# Patient Record
Sex: FEMALE | Race: WHITE | NOT HISPANIC OR LATINO
[De-identification: names, ages, dates, MRNs, and addresses within clinical notes are randomized per-mention and may not be internally consistent; named-entity substitution may affect disease eponyms.]

---

## 2018-05-10 ENCOUNTER — APPOINTMENT (OUTPATIENT)
Dept: OTOLARYNGOLOGY | Facility: CLINIC | Age: 68
End: 2018-05-10
Payer: MEDICARE

## 2018-05-10 VITALS
HEART RATE: 63 BPM | BODY MASS INDEX: 20.77 KG/M2 | HEIGHT: 61 IN | DIASTOLIC BLOOD PRESSURE: 61 MMHG | SYSTOLIC BLOOD PRESSURE: 110 MMHG | WEIGHT: 110 LBS

## 2018-05-10 DIAGNOSIS — H93.13 TINNITUS, BILATERAL: ICD-10-CM

## 2018-05-10 DIAGNOSIS — H90.3 SENSORINEURAL HEARING LOSS, BILATERAL: ICD-10-CM

## 2018-05-10 DIAGNOSIS — Z86.39 PERSONAL HISTORY OF OTHER ENDOCRINE, NUTRITIONAL AND METABOLIC DISEASE: ICD-10-CM

## 2018-05-10 DIAGNOSIS — Z86.79 PERSONAL HISTORY OF OTHER DISEASES OF THE CIRCULATORY SYSTEM: ICD-10-CM

## 2018-05-10 PROCEDURE — 92588 EVOKED AUDITORY TST COMPLETE: CPT

## 2018-05-10 PROCEDURE — 92557 COMPREHENSIVE HEARING TEST: CPT

## 2018-05-10 PROCEDURE — 92567 TYMPANOMETRY: CPT

## 2018-05-10 PROCEDURE — 99204 OFFICE O/P NEW MOD 45 MIN: CPT

## 2018-06-06 ENCOUNTER — APPOINTMENT (OUTPATIENT)
Dept: OTOLARYNGOLOGY | Facility: CLINIC | Age: 68
End: 2018-06-06

## 2019-06-03 ENCOUNTER — FORM ENCOUNTER (OUTPATIENT)
Age: 69
End: 2019-06-03

## 2019-06-03 ENCOUNTER — LABORATORY RESULT (OUTPATIENT)
Age: 69
End: 2019-06-03

## 2019-06-03 ENCOUNTER — APPOINTMENT (OUTPATIENT)
Dept: INTERNAL MEDICINE | Facility: CLINIC | Age: 69
End: 2019-06-03
Payer: MEDICARE

## 2019-06-03 ENCOUNTER — APPOINTMENT (OUTPATIENT)
Dept: INTERNAL MEDICINE | Facility: CLINIC | Age: 69
End: 2019-06-03

## 2019-06-03 VITALS
HEART RATE: 73 BPM | OXYGEN SATURATION: 97 % | TEMPERATURE: 98 F | BODY MASS INDEX: 20.08 KG/M2 | WEIGHT: 106.38 LBS | HEIGHT: 61 IN | DIASTOLIC BLOOD PRESSURE: 60 MMHG | SYSTOLIC BLOOD PRESSURE: 100 MMHG | RESPIRATION RATE: 18 BRPM

## 2019-06-03 PROCEDURE — 99214 OFFICE O/P EST MOD 30 MIN: CPT

## 2019-06-03 NOTE — ASSESSMENT
[FreeTextEntry1] : #1 recent viral illness. Patient is having tiredness. Some cough. Some sore throat. She also tells me she had a tick bite by a deer tick about one month ago. She denies fever or rash. We'll check labs for CBC, ESR, CMP, Lyme titers. Also chest x-ray. Patient instructed to call or return if symptoms are not improving/resolving.\par \par #2 patient is scheduled to travel to [facility] in one week and is requesting a scopolamine patch refill. This was refilled today. Patient knows if she is not feeling well on vacation she will see a physician while away.

## 2019-06-03 NOTE — HISTORY OF PRESENT ILLNESS
[FreeTextEntry1] : RV, tired [de-identified] : A 68-year-old female who was exposed to her grandson to 2-1/2 weeks ago who had a cold. Subsequently developed cold symptoms with sore throat, postnasal drip, cough; she was seen at urgent care and treated with Augmentin which she took for 10 days. Says throat culture "Strep G."  She now comes in with a complaint of feeling tired. She has some cough but no sputum production no shortness of breath. She does note a sore throat. Is not having fever or chills.\par \par She also states that she had a tick bite it in late April. He did not develop rash or fever.\par \par She does have bursitis of the left elbow recently and is being treated by orthopedics with topical voltaren and a wrap.

## 2019-06-03 NOTE — PHYSICAL EXAM
[No Acute Distress] : no acute distress [Well Nourished] : well nourished [Well Developed] : well developed [Normal Sclera/Conjunctiva] : normal sclera/conjunctiva [Normal Outer Ear/Nose] : the outer ears and nose were normal in appearance [Normal Oropharynx] : the oropharynx was normal [No JVD] : no jugular venous distention [Supple] : supple [No Lymphadenopathy] : no lymphadenopathy [No Respiratory Distress] : no respiratory distress  [No Accessory Muscle Use] : no accessory muscle use [Clear to Auscultation] : lungs were clear to auscultation bilaterally [Normal Rate] : normal rate  [Regular Rhythm] : with a regular rhythm [Normal S1, S2] : normal S1 and S2 [No Edema] : there was no peripheral edema [No Extremity Clubbing/Cyanosis] : no extremity clubbing/cyanosis [Soft] : abdomen soft [Non Tender] : non-tender [Non-distended] : non-distended [No HSM] : no HSM [Normal Bowel Sounds] : normal bowel sounds [Normal Anterior Cervical Nodes] : no anterior cervical lymphadenopathy [No Rash] : no rash [No Focal Deficits] : no focal deficits [Normal Affect] : the affect was normal [Normal Insight/Judgement] : insight and judgment were intact [de-identified] : TM's normal [de-identified] : swelling L elbow bursa, no redness.

## 2019-06-04 ENCOUNTER — OUTPATIENT (OUTPATIENT)
Dept: OUTPATIENT SERVICES | Facility: HOSPITAL | Age: 69
LOS: 1 days | End: 2019-06-04
Payer: MEDICARE

## 2019-06-04 ENCOUNTER — APPOINTMENT (OUTPATIENT)
Dept: RADIOLOGY | Facility: CLINIC | Age: 69
End: 2019-06-04
Payer: MEDICARE

## 2019-06-04 ENCOUNTER — LABORATORY RESULT (OUTPATIENT)
Age: 69
End: 2019-06-04

## 2019-06-04 DIAGNOSIS — Z00.8 ENCOUNTER FOR OTHER GENERAL EXAMINATION: ICD-10-CM

## 2019-06-04 LAB
ALBUMIN SERPL ELPH-MCNC: 4.7 G/DL
ALP BLD-CCNC: 76 U/L
ALT SERPL-CCNC: 29 U/L
ANION GAP SERPL CALC-SCNC: 11 MMOL/L
AST SERPL-CCNC: 29 U/L
B BURGDOR IGG+IGM SER QL IB: NORMAL
BASOPHILS # BLD AUTO: 0.09 K/UL
BASOPHILS NFR BLD AUTO: 1 %
BILIRUB SERPL-MCNC: 0.9 MG/DL
BUN SERPL-MCNC: 24 MG/DL
CALCIUM SERPL-MCNC: 9.7 MG/DL
CHLORIDE SERPL-SCNC: 101 MMOL/L
CO2 SERPL-SCNC: 28 MMOL/L
CREAT SERPL-MCNC: 0.84 MG/DL
EOSINOPHIL # BLD AUTO: 0.07 K/UL
EOSINOPHIL NFR BLD AUTO: 0.8 %
ERYTHROCYTE [SEDIMENTATION RATE] IN BLOOD BY WESTERGREN METHOD: 16 MM/HR
GLUCOSE SERPL-MCNC: 90 MG/DL
HCT VFR BLD CALC: 37.6 %
HGB BLD-MCNC: 12.6 G/DL
IMM GRANULOCYTES NFR BLD AUTO: 0.2 %
LYMPHOCYTES # BLD AUTO: 1.83 K/UL
LYMPHOCYTES NFR BLD AUTO: 20.5 %
MAN DIFF?: NORMAL
MCHC RBC-ENTMCNC: 30.4 PG
MCHC RBC-ENTMCNC: 33.5 GM/DL
MCV RBC AUTO: 90.8 FL
MONOCYTES # BLD AUTO: 0.49 K/UL
MONOCYTES NFR BLD AUTO: 5.5 %
NEUTROPHILS # BLD AUTO: 6.41 K/UL
NEUTROPHILS NFR BLD AUTO: 72 %
PLATELET # BLD AUTO: 260 K/UL
POTASSIUM SERPL-SCNC: 4.3 MMOL/L
PROT SERPL-MCNC: 6.7 G/DL
RBC # BLD: 4.14 M/UL
RBC # FLD: 12.2 %
SODIUM SERPL-SCNC: 140 MMOL/L
T3FREE SERPL-MCNC: 1.82 PG/ML
T4 FREE SERPL-MCNC: 1.2 NG/DL
TSH SERPL-ACNC: 3.67 UIU/ML
WBC # FLD AUTO: 8.91 K/UL

## 2019-06-04 PROCEDURE — 71046 X-RAY EXAM CHEST 2 VIEWS: CPT | Mod: 26

## 2019-06-04 PROCEDURE — 71046 X-RAY EXAM CHEST 2 VIEWS: CPT

## 2019-06-10 LAB — B BURGDOR DNA TICK QL NAA+PROBE: NOT DETECTED

## 2019-06-12 LAB

## 2021-01-15 ENCOUNTER — NON-APPOINTMENT (OUTPATIENT)
Age: 71
End: 2021-01-15

## 2021-01-15 ENCOUNTER — APPOINTMENT (OUTPATIENT)
Dept: INTERNAL MEDICINE | Facility: CLINIC | Age: 71
End: 2021-01-15
Payer: MEDICARE

## 2021-01-15 VITALS
BODY MASS INDEX: 21.14 KG/M2 | WEIGHT: 112 LBS | TEMPERATURE: 98.6 F | HEART RATE: 65 BPM | SYSTOLIC BLOOD PRESSURE: 108 MMHG | HEIGHT: 61 IN | DIASTOLIC BLOOD PRESSURE: 70 MMHG | RESPIRATION RATE: 18 BRPM | OXYGEN SATURATION: 98 %

## 2021-01-15 DIAGNOSIS — Z86.19 PERSONAL HISTORY OF OTHER INFECTIOUS AND PARASITIC DISEASES: ICD-10-CM

## 2021-01-15 DIAGNOSIS — R53.83 OTHER FATIGUE: ICD-10-CM

## 2021-01-15 DIAGNOSIS — Z87.898 PERSONAL HISTORY OF OTHER SPECIFIED CONDITIONS: ICD-10-CM

## 2021-01-15 DIAGNOSIS — J45.909 UNSPECIFIED ASTHMA, UNCOMPLICATED: ICD-10-CM
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
## 2021-01-15 PROCEDURE — G0439: CPT

## 2021-01-15 RX ORDER — PNV NO.95/FERROUS FUM/FOLIC AC 28MG-0.8MG
TABLET ORAL
Refills: 0 | Status: COMPLETED | COMMUNITY
End: 2021-01-15

## 2021-01-15 NOTE — PHYSICAL EXAM
[No Acute Distress] : no acute distress [Well Nourished] : well nourished [Well Developed] : well developed [PERRL] : pupils equal round and reactive to light [EOMI] : extraocular movements intact [Normal Oropharynx] : the oropharynx was normal [Normal TMs] : both tympanic membranes were normal [No Respiratory Distress] : no respiratory distress  [No Accessory Muscle Use] : no accessory muscle use [Clear to Auscultation] : lungs were clear to auscultation bilaterally [Normal Rate] : normal rate  [Regular Rhythm] : with a regular rhythm [Normal S1, S2] : normal S1 and S2 [Pedal Pulses Present] : the pedal pulses are present [No Extremity Clubbing/Cyanosis] : no extremity clubbing/cyanosis [Normal Posterior Cervical Nodes] : no posterior cervical lymphadenopathy [Normal Anterior Cervical Nodes] : no anterior cervical lymphadenopathy [No Focal Deficits] : no focal deficits [Coordination Grossly Intact] : coordination grossly intact [Normal Gait] : normal gait [Normal Affect] : the affect was normal [Alert and Oriented x3] : oriented to person, place, and time [Normal Insight/Judgement] : insight and judgment were intact

## 2021-01-15 NOTE — HISTORY OF PRESENT ILLNESS
[FreeTextEntry1] : CPE [de-identified] : Pt here for  yearly exam. She was last seen in the office 6/2019. She has avoided going [facility]." She feels well, has been walking 5 miles/ day and eats " well." She has a h/o of asthma, stopped taking her daily maintenance med , Qvar but has been experiencing LOTT with walking outside in cold dry air. Understands needs to go back on it. script will be sent .\par She also has no  had a mammo in over 3 years. Her previous GYN passed away. She will be making an appt with Dr. Fisher in the next few weeks.\par She sees endocrine MD for thyroid disease. HAs not had a TSH drawn recently.   \tatiana Denies fever, chills, SOB, chest pain, cough, night sweats,

## 2021-01-15 NOTE — ASSESSMENT
[FreeTextEntry1] : comprehensive FBW to be completed\par continue current  meds.\par importance of using Qvar as maintenance inhaler discussed with pt \par renewed albuterol and Qvar\par Overdue  mammo, f/up GYN, MD \par dental/ eye yearly 'OV 6 months \par \par

## 2021-01-15 NOTE — HEALTH RISK ASSESSMENT
[Very Good] : ~his/her~  mood as very good [] : No [No] : In the past 12 months have you used drugs other than those required for medical reasons? No [No falls in past year] : Patient reported no falls in the past year [de-identified] : walks 5 miles/ day  [Patient reported mammogram was normal] : Patient reported mammogram was normal [Change in mental status noted] : No change in mental status noted [None] : None [With Significant Other] : lives with significant other [MammogramDate] : 01/2018

## 2021-03-08 ENCOUNTER — APPOINTMENT (OUTPATIENT)
Dept: OBGYN | Facility: CLINIC | Age: 71
End: 2021-03-08
Payer: MEDICARE

## 2021-03-08 VITALS
WEIGHT: 112 LBS | HEIGHT: 60 IN | BODY MASS INDEX: 21.99 KG/M2 | DIASTOLIC BLOOD PRESSURE: 60 MMHG | SYSTOLIC BLOOD PRESSURE: 120 MMHG

## 2021-03-08 DIAGNOSIS — Z01.419 ENCOUNTER FOR GYNECOLOGICAL EXAMINATION (GENERAL) (ROUTINE) W/OUT ABNORMAL FINDINGS: ICD-10-CM

## 2021-03-08 PROCEDURE — G0101: CPT

## 2021-03-10 LAB — HPV HIGH+LOW RISK DNA PNL CVX: NOT DETECTED

## 2021-03-17 ENCOUNTER — NON-APPOINTMENT (OUTPATIENT)
Age: 71
End: 2021-03-17

## 2021-03-18 ENCOUNTER — OUTPATIENT (OUTPATIENT)
Dept: OUTPATIENT SERVICES | Facility: HOSPITAL | Age: 71
LOS: 1 days | End: 2021-03-18
Payer: MEDICARE

## 2021-03-18 ENCOUNTER — APPOINTMENT (OUTPATIENT)
Dept: ULTRASOUND IMAGING | Facility: CLINIC | Age: 71
End: 2021-03-18
Payer: MEDICARE

## 2021-03-18 ENCOUNTER — RESULT REVIEW (OUTPATIENT)
Age: 71
End: 2021-03-18

## 2021-03-18 ENCOUNTER — APPOINTMENT (OUTPATIENT)
Dept: NEUROLOGY | Facility: CLINIC | Age: 71
End: 2021-03-18
Payer: MEDICARE

## 2021-03-18 ENCOUNTER — APPOINTMENT (OUTPATIENT)
Dept: RADIOLOGY | Facility: CLINIC | Age: 71
End: 2021-03-18
Payer: MEDICARE

## 2021-03-18 ENCOUNTER — APPOINTMENT (OUTPATIENT)
Dept: MAMMOGRAPHY | Facility: CLINIC | Age: 71
End: 2021-03-18
Payer: MEDICARE

## 2021-03-18 VITALS
HEIGHT: 60 IN | WEIGHT: 112 LBS | SYSTOLIC BLOOD PRESSURE: 120 MMHG | HEART RATE: 73 BPM | BODY MASS INDEX: 21.99 KG/M2 | DIASTOLIC BLOOD PRESSURE: 69 MMHG | TEMPERATURE: 97.8 F

## 2021-03-18 DIAGNOSIS — Z00.00 ENCOUNTER FOR GENERAL ADULT MEDICAL EXAMINATION WITHOUT ABNORMAL FINDINGS: ICD-10-CM

## 2021-03-18 DIAGNOSIS — Z82.5 FAMILY HISTORY OF ASTHMA AND OTHER CHRONIC LOWER RESPIRATORY DISEASES: ICD-10-CM

## 2021-03-18 DIAGNOSIS — Z82.49 FAMILY HISTORY OF ISCHEMIC HEART DISEASE AND OTHER DISEASES OF THE CIRCULATORY SYSTEM: ICD-10-CM

## 2021-03-18 PROCEDURE — 77085 DXA BONE DENSITY AXL VRT FX: CPT | Mod: 26

## 2021-03-18 PROCEDURE — 76830 TRANSVAGINAL US NON-OB: CPT | Mod: 26

## 2021-03-18 PROCEDURE — 99204 OFFICE O/P NEW MOD 45 MIN: CPT

## 2021-03-18 PROCEDURE — 77063 BREAST TOMOSYNTHESIS BI: CPT | Mod: 26

## 2021-03-18 PROCEDURE — 77067 SCR MAMMO BI INCL CAD: CPT | Mod: 26

## 2021-03-18 PROCEDURE — 76830 TRANSVAGINAL US NON-OB: CPT

## 2021-03-18 PROCEDURE — 77085 DXA BONE DENSITY AXL VRT FX: CPT

## 2021-03-18 PROCEDURE — 77063 BREAST TOMOSYNTHESIS BI: CPT

## 2021-03-18 PROCEDURE — 77067 SCR MAMMO BI INCL CAD: CPT

## 2021-03-18 RX ORDER — DULOXETINE HYDROCHLORIDE 60 MG/1
60 CAPSULE, DELAYED RELEASE PELLETS ORAL
Refills: 0 | Status: ACTIVE | COMMUNITY

## 2021-03-18 RX ORDER — DULOXETINE HYDROCHLORIDE 20 MG/1
20 CAPSULE, DELAYED RELEASE PELLETS ORAL
Refills: 0 | Status: ACTIVE | COMMUNITY

## 2021-03-18 NOTE — REASON FOR VISIT
[Consultation] : a consultation visit [FreeTextEntry1] : Pt coming for Evaluation for Ch daily Headaches since 11/20

## 2021-03-18 NOTE — HISTORY OF PRESENT ILLNESS
[FreeTextEntry1] : She is 70-year-old patient with hyperthyroidism, hyperlipidemia, panic attacks, BPV coming here for evaluation for six-month history of chronic daily headaches for which she was evaluated by ENT to rule out sinus headaches and recommended neurological evaluation.\par Had CT sinuses did not reveal any acute pathology. Most the headaches are pulsatile throbbing headaches left more than right side associated with light sensitivity and nausea and dizziness aggravates her symptoms. Does not relieve with taking over-the-counter medications. Usually takes Motrin 600 mg as needed sometimes review headaches.\par Also recently tried Excedrin migraine which made her shaky and nervous. Stopped using the medication. Denies of any focal paresthesias or numbness or weakness with the headaches.\par No imaging studies other than sinus CT scan. Recent blood work done by you did not reveal any significant problems except for elevated lipid profile. Started on statin recently.\par Seen by ophthalmology and ENT no acute problems were found.\par Patient is very  active physically having difficult time dealing with daily activities due to headaches.Episodes of lack of focusing and concentration and episodes of confusion sometimes.\par

## 2021-03-18 NOTE — REVIEW OF SYSTEMS
[Poor Coordination] : poor coordination [Dizziness] : dizziness [Vertigo] : vertigo [Cluster Headache] : cluster headaches [Migraine Headache] : migraine headaches [Tension Headache] : tension-type headaches [Anxiety] : anxiety [Loss Of Hearing] : hearing loss [Negative] : Heme/Lymph [de-identified] : Panic attacks [FreeTextEntry4] : Tinnitus

## 2021-03-18 NOTE — DISCUSSION/SUMMARY
[FreeTextEntry1] : She is 70-year-old patient with a history of hypothyroidism, hyperlipidemia with anxiety and panic attacks coming here for evaluation of a recent onset of six-month history of daily headaches and worsening recently with underlying dizziness and episodes of confusion. Rule out CNS pathology vascular pathology.\par Recommend patient to have MRI of the brain attention to IAC views with contrast.\par MR angiogram of the carotids and neck.\par Patient is claustrophobic needs to go to stand up MRI.\par Recommend EEG.\par Lab work to rule out vasculitic pathology.\par Recommend patient to keep a headache log.\par Return for followup evaluation 3-4 weeks after the workup is completed.\par Patient might benefit from daily prophylactic medication like topiramate or valproic acid which might help for anxiety.\par Will discuss with the patient didn't next visit. Patient education provided and discussed with the patient.

## 2021-03-18 NOTE — PHYSICAL EXAM
[General Appearance - Alert] : alert [General Appearance - In No Acute Distress] : in no acute distress [Oriented To Time, Place, And Person] : oriented to person, place, and time [Impaired Insight] : insight and judgment were intact [Affect] : the affect was normal [Person] : oriented to person [Place] : oriented to place [Time] : oriented to time [Concentration Intact] : normal concentrating ability [Visual Intact] : visual attention was ~T not ~L decreased [Naming Objects] : no difficulty naming common objects [Repeating Phrases] : no difficulty repeating a phrase [Writing A Sentence] : no difficulty writing a sentence [Fluency] : fluency intact [Comprehension] : comprehension intact [Reading] : reading intact [Past History] : adequate knowledge of personal past history [Cranial Nerves Optic (II)] : visual acuity intact bilaterally,  visual fields full to confrontation, pupils equal round and reactive to light [Cranial Nerves Oculomotor (III)] : extraocular motion intact [Cranial Nerves Trigeminal (V)] : facial sensation intact symmetrically [Cranial Nerves Facial (VII)] : face symmetrical [Cranial Nerves Glossopharyngeal (IX)] : tongue and palate midline [Cranial Nerves Accessory (XI - Cranial And Spinal)] : head turning and shoulder shrug symmetric [Cranial Nerves Hypoglossal (XII)] : there was no tongue deviation with protrusion [Motor Strength] : muscle strength was normal in all four extremities [No Muscle Atrophy] : normal bulk in all four extremities [Sensation Tactile Decrease] : light touch was intact [Limited Balance] : the patient's balance was impaired [Past-pointing] : there was no past-pointing [Tremor] : no tremor present [2+] : Patella left 2+ [1+] : Ankle jerk left 1+ [Plantar Reflex Right Only] : normal on the right [Plantar Reflex Left Only] : normal on the left [FreeTextEntry4] : Need to repeat constantly [FreeTextEntry5] : RAKESH [FreeTextEntry8] : Mild unsteady [Sclera] : the sclera and conjunctiva were normal [PERRL With Normal Accommodation] : pupils were equal in size, round, reactive to light, with normal accommodation [Extraocular Movements] : extraocular movements were intact [Outer Ear] : the ears and nose were normal in appearance [Oropharynx] : the oropharynx was normal [Neck Appearance] : the appearance of the neck was normal [Neck Cervical Mass (___cm)] : no neck mass was observed [Jugular Venous Distention Increased] : there was no jugular-venous distention [Thyroid Diffuse Enlargement] : the thyroid was not enlarged [Thyroid Nodule] : there were no palpable thyroid nodules [Auscultation Breath Sounds / Voice Sounds] : lungs were clear to auscultation bilaterally [Heart Rate And Rhythm] : heart rate was normal and rhythm regular [Heart Sounds] : normal S1 and S2 [Heart Sounds Gallop] : no gallops [Murmurs] : no murmurs [Heart Sounds Pericardial Friction Rub] : no pericardial rub [Full Pulse] : the pedal pulses are present [Edema] : there was no peripheral edema [Bowel Sounds] : normal bowel sounds [Abdomen Soft] : soft [Abdomen Tenderness] : non-tender [Abdomen Mass (___ Cm)] : no abdominal mass palpated [No CVA Tenderness] : no ~M costovertebral angle tenderness [No Spinal Tenderness] : no spinal tenderness [Nail Clubbing] : no clubbing  or cyanosis of the fingernails [Musculoskeletal - Swelling] : no joint swelling seen [Motor Tone] : muscle strength and tone were normal [FreeTextEntry1] : Mild unsteady [Skin Color & Pigmentation] : normal skin color and pigmentation [Skin Turgor] : normal skin turgor [] : no rash

## 2021-03-25 ENCOUNTER — NON-APPOINTMENT (OUTPATIENT)
Age: 71
End: 2021-03-25

## 2021-03-31 ENCOUNTER — APPOINTMENT (OUTPATIENT)
Dept: NEUROLOGY | Facility: CLINIC | Age: 71
End: 2021-03-31
Payer: MEDICARE

## 2021-03-31 PROCEDURE — 95816 EEG AWAKE AND DROWSY: CPT

## 2021-04-26 ENCOUNTER — APPOINTMENT (OUTPATIENT)
Dept: OBGYN | Facility: CLINIC | Age: 71
End: 2021-04-26
Payer: MEDICARE

## 2021-04-26 VITALS
WEIGHT: 115 LBS | SYSTOLIC BLOOD PRESSURE: 118 MMHG | HEIGHT: 60 IN | BODY MASS INDEX: 22.58 KG/M2 | DIASTOLIC BLOOD PRESSURE: 66 MMHG

## 2021-04-26 PROCEDURE — 99212 OFFICE O/P EST SF 10 MIN: CPT

## 2021-04-27 RX ORDER — DENOSUMAB 60 MG/ML
60 INJECTION SUBCUTANEOUS
Qty: 1 | Refills: 2 | Status: ACTIVE | COMMUNITY
Start: 2021-04-27 | End: 1900-01-01

## 2021-05-03 ENCOUNTER — APPOINTMENT (OUTPATIENT)
Dept: NEUROLOGY | Facility: CLINIC | Age: 71
End: 2021-05-03
Payer: MEDICARE

## 2021-05-03 VITALS
TEMPERATURE: 97.2 F | HEART RATE: 65 BPM | DIASTOLIC BLOOD PRESSURE: 60 MMHG | HEIGHT: 60 IN | SYSTOLIC BLOOD PRESSURE: 110 MMHG | BODY MASS INDEX: 22.58 KG/M2 | WEIGHT: 115 LBS

## 2021-05-03 DIAGNOSIS — G43.709 CHRONIC MIGRAINE W/OUT AURA, NOT INTRACTABLE, W/OUT STATUS MIGRAINOSUS: ICD-10-CM

## 2021-05-03 PROCEDURE — 99213 OFFICE O/P EST LOW 20 MIN: CPT

## 2021-05-03 NOTE — HISTORY OF PRESENT ILLNESS
[FreeTextEntry1] : She is 70-year-old patient coming here for followup evaluation for persistent chronic migraine headaches mixed with tension headaches and dizziness. Unable to do MRI scan due to claustrophobia the MRI angiogram did not reveal any acute pathology.\par  EEG did not reveal any acute problems reported normal.\par Patient feels fine no new complaints.

## 2021-05-03 NOTE — DISCUSSION/SUMMARY
[FreeTextEntry1] : A 70-year-old patient with underlying anxiety panic attacks unable to do MRI scan but persistent chronic migraine headaches mixed tension headaches daily headaches numbness in the scalp.\par Recommend patient to start on Topamax 25 mg at bedtime titrate the dose to 50 mg as tolerated.\par If patient symptoms persist consider MRI scan with sedation.\par Patient education provided and discussed with the patient.\par Follow up with you for other medical problems.\par Return for reevaluation in 2-3 months as needed.

## 2021-05-03 NOTE — REASON FOR VISIT
[Follow-Up: _____] : a [unfilled] follow-up visit [FreeTextEntry1] : Follow up for pt with Ch Migraines mixed with Tension Headaches

## 2021-05-03 NOTE — DATA REVIEWED
[de-identified] : MRI not done\par MRA neg [de-identified] : Normal [de-identified] : CT sinuses Mild sinusitis

## 2021-05-03 NOTE — REVIEW OF SYSTEMS
[Poor Coordination] : poor coordination [Dizziness] : dizziness [Vertigo] : vertigo [Cluster Headache] : cluster headaches [Migraine Headache] : migraine headaches [Tension Headache] : tension-type headaches [Anxiety] : anxiety [Loss Of Hearing] : hearing loss [Negative] : Heme/Lymph [de-identified] : Panic attacks [FreeTextEntry4] : Tinnitus

## 2021-05-06 ENCOUNTER — APPOINTMENT (OUTPATIENT)
Dept: ORTHOPEDIC SURGERY | Facility: CLINIC | Age: 71
End: 2021-05-06
Payer: MEDICARE

## 2021-05-06 VITALS
SYSTOLIC BLOOD PRESSURE: 124 MMHG | HEART RATE: 72 BPM | WEIGHT: 115 LBS | DIASTOLIC BLOOD PRESSURE: 66 MMHG | BODY MASS INDEX: 22.58 KG/M2 | HEIGHT: 60 IN

## 2021-05-06 DIAGNOSIS — Z87.09 PERSONAL HISTORY OF OTHER DISEASES OF THE RESPIRATORY SYSTEM: ICD-10-CM

## 2021-05-06 PROCEDURE — 99203 OFFICE O/P NEW LOW 30 MIN: CPT

## 2021-05-06 PROCEDURE — 73522 X-RAY EXAM HIPS BI 3-4 VIEWS: CPT

## 2021-05-13 NOTE — HISTORY OF PRESENT ILLNESS
[de-identified] : The patient comes in today with complaints to her bilateral hips. The patient states the pain is localized.  The patient describes the pain as achy.  The patient notes medication (ibuprofen) makes her symptoms better, while walking makes her symptoms worse.  The patient indicates a pain level of 2 on a pain scale of 0-10.

## 2021-05-13 NOTE — PHYSICAL EXAM
[de-identified] : Right Hip: \par Range of Motion in Degrees:\par 	                                 Claimant:	   Normal:	\par Flexion (Active) 	                 120 	   120-degrees	\par Flexion (Passive)	                 120	   120-degrees	\par Extension (Active)	                 -30	   -30-degrees	\par Extension (Passive)	 -30	   -30-degrees	\par Abduction (Active)	                 45-50	   93-97-yyxczdg	\par Abduction (Passive)	 45-50	   00-47-xmdywed	\par Adduction (Active)  	 20-30	   72-12-xudtfff	\par Adduction (Passive)	 20-30	   28-81-gzrjllt	\par Internal Rotation (Active) 	 35	   35-degrees	\par Internal Rotation (Passive)	 35	   35-degrees	\par External Rotation (Active)	 45	   45-degrees	\par External Rotation (Passive)	 45	   45-degrees	\par \par Tenderness at the pubic symphysis. Negative Trendelenburg.  No tenderness with resisted abduction.  No weakness to flexion, extension, abduction or adduction.  No evidence of instability.  No motor or sensory deficits.  2+ DP and PT pulses.  Skin is intact.  No scars, rashes or lesions.  \par Left Hip:\par Range of Motion in Degrees:\par 	                                 Claimant:	   Normal:	\par Flexion (Active) 	                 120 	   120-degrees	\par Flexion (Passive)	                 120	   120-degrees	\par Extension (Active)	                 -30	   -30-degrees	\par Extension (Passive)	 -30	   -30-degrees	\par Abduction (Active)	                 45-50	   97-92-npvjdaf	\par Abduction (Passive)	 45-50	   34-08-wspiujv	\par Adduction (Active)  	 20-30	   90-30-dbsmvkd	\par Adduction (Passive)	 20-30	   13-35-jruijpt	\par Internal Rotation (Active) 	 35	   35-degrees	\par Internal Rotation (Passive)	 35	   35-degrees	\par External Rotation (Active)	 45	   45-degrees	\par External Rotation (Passive)	 45	   45-degrees	\par \par Tenderness at the pubic symphysis. Negative Trendelenburg.  No tenderness with resisted abduction.  No weakness to flexion, extension, abduction or adduction.  No evidence of instability.  No motor or sensory deficits.  2+ DP and PT pulses.  Skin is intact.  No scars, rashes or lesions.\par   [de-identified] : Ambulating with a slightly antalgic to antalgic gait.  Station:  Normal.  [de-identified] : Appearance:  Well-developed, well-nourished female in no acute distress.\par   [de-identified] : Radiographs, two-three views of the right hip and two-three views of the left hip, including the pelvis, show no obvious osseous abnormalities.

## 2021-05-13 NOTE — DISCUSSION/SUMMARY
[de-identified] : At this time, due to pubic symphysis of bilateral hips, I recommended an MRI.  She will be reassessed after the MRI.

## 2021-05-13 NOTE — CONSULT LETTER
[Dear  ___] : Dear  [unfilled], [DrAilyn  ___] : Dr. GARCIA [FreeTextEntry1] : I had the pleasure of evaluating your patient, Katie Samaniego.\par \par Thank you very much for allowing me to participate in the care of this patient.\par Please see my note below.\par \par If you have any questions, please do not hesitate to contact me.\par \par Sincerely,\par \par \par \par Cuco Cerrato III, MD\par RAYSA/sg

## 2021-05-13 NOTE — ADDENDUM
[FreeTextEntry1] : This note was written by Lala Alexandre on 05/13/2021 acting as a scribe for LIZ RAMOS III, MD

## 2021-05-14 ENCOUNTER — APPOINTMENT (OUTPATIENT)
Dept: MRI IMAGING | Facility: CLINIC | Age: 71
End: 2021-05-14

## 2021-05-26 ENCOUNTER — APPOINTMENT (OUTPATIENT)
Dept: ORTHOPEDIC SURGERY | Facility: CLINIC | Age: 71
End: 2021-05-26

## 2021-05-27 ENCOUNTER — APPOINTMENT (OUTPATIENT)
Dept: ORTHOPEDIC SURGERY | Facility: CLINIC | Age: 71
End: 2021-05-27
Payer: MEDICARE

## 2021-05-27 VITALS
BODY MASS INDEX: 22.58 KG/M2 | DIASTOLIC BLOOD PRESSURE: 63 MMHG | HEART RATE: 75 BPM | WEIGHT: 115 LBS | SYSTOLIC BLOOD PRESSURE: 117 MMHG | HEIGHT: 60 IN

## 2021-05-27 DIAGNOSIS — M19.91 PRIMARY OSTEOARTHRITIS, UNSPECIFIED SITE: ICD-10-CM

## 2021-05-27 PROCEDURE — 99213 OFFICE O/P EST LOW 20 MIN: CPT

## 2021-06-02 NOTE — PHYSICAL EXAM
[de-identified] : Right Hip: \par Range of Motion in Degrees:\par 	   Claimant:	 Normal:	\par Flexion (Active) 	  120 	 120-degrees	\par Flexion (Passive)	  120	 120-degrees	\par Extension (Active)	  -30	 -30-degrees	\par Extension (Passive)	 -30	 -30-degrees	\par Abduction (Active)	  45-50	 59-22-aksyyds	\par Abduction (Passive)	 45-50	 89-44-hglvjcl	\par Adduction (Active) 	 20-30	 30-27-ipdlknm	\par Adduction (Passive)	 20-30	 35-00-wgyovii	\par Internal Rotation (Active) 	 35	 35-degrees	\par Internal Rotation (Passive)	 35	 35-degrees	\par External Rotation (Active)	 45	 45-degrees	\par External Rotation (Passive)	 45	 45-degrees	\par \par Tenderness at the pubic symphysis. Negative Trendelenburg. No tenderness with resisted abduction. No weakness to flexion, extension, abduction or adduction. No evidence of instability. No motor or sensory deficits. 2+ DP and PT pulses. Skin is intact. No scars, rashes or lesions. \par \par Left Hip:\par Range of Motion in Degrees:\par 	   Claimant:	 Normal:	\par Flexion (Active) 	  120 	 120-degrees	\par Flexion (Passive)	  120	 120-degrees	\par Extension (Active)	  -30	 -30-degrees	\par Extension (Passive)	 -30	 -30-degrees	\par Abduction (Active)	  45-50	 94-77-catlteg	\par Abduction (Passive)	 45-50	 84-07-ztsjihy	\par Adduction (Active) 	 20-30	 28-28-zmmckzf	\par Adduction (Passive)	 20-30	 46-56-qnzjuoe	\par Internal Rotation (Active) 	 35	 35-degrees	\par Internal Rotation (Passive)	 35	 35-degrees	\par External Rotation (Active)	 45	 45-degrees	\par External Rotation (Passive)	 45	 45-degrees	\par \par Tenderness at the pubic symphysis. Negative Trendelenburg. No tenderness with resisted abduction. No weakness to flexion, extension, abduction or adduction. No evidence of instability. No motor or sensory deficits. 2+ DP and PT pulses. Skin is intact. No scars, rashes or lesions.\par  [de-identified] : Gait and Station:  Ambulating with a slightly antalgic to antalgic gait.  Normal Station.  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

## 2021-06-02 NOTE — DISCUSSION/SUMMARY
[de-identified] : At this time, due to osteitis pubis, I recommended ice, anti-inflammatory and reassessment in 4 weeks.\par

## 2021-06-02 NOTE — ADDENDUM
[FreeTextEntry1] : This note was written by Xiang Nicole on 06/02/2021, acting as a scribe for Cuco Cerrato III, MD

## 2021-06-08 ENCOUNTER — APPOINTMENT (OUTPATIENT)
Dept: OBGYN | Facility: CLINIC | Age: 71
End: 2021-06-08
Payer: MEDICARE

## 2021-06-08 VITALS
BODY MASS INDEX: 20.61 KG/M2 | HEART RATE: 78 BPM | WEIGHT: 112 LBS | DIASTOLIC BLOOD PRESSURE: 50 MMHG | TEMPERATURE: 97.8 F | RESPIRATION RATE: 16 BRPM | HEIGHT: 62 IN | SYSTOLIC BLOOD PRESSURE: 112 MMHG

## 2021-06-08 PROCEDURE — 99212 OFFICE O/P EST SF 10 MIN: CPT | Mod: 25

## 2021-06-08 PROCEDURE — 96372 THER/PROPH/DIAG INJ SC/IM: CPT

## 2021-06-11 ENCOUNTER — EMERGENCY (EMERGENCY)
Facility: HOSPITAL | Age: 71
LOS: 0 days | Discharge: HOME | End: 2021-06-11
Attending: EMERGENCY MEDICINE
Payer: MEDICARE

## 2021-06-11 VITALS
SYSTOLIC BLOOD PRESSURE: 137 MMHG | TEMPERATURE: 98 F | DIASTOLIC BLOOD PRESSURE: 76 MMHG | OXYGEN SATURATION: 100 % | RESPIRATION RATE: 16 BRPM | HEART RATE: 71 BPM

## 2021-06-11 VITALS — WEIGHT: 111.99 LBS

## 2021-06-11 DIAGNOSIS — Z88.2 ALLERGY STATUS TO SULFONAMIDES: ICD-10-CM

## 2021-06-11 DIAGNOSIS — E78.5 HYPERLIPIDEMIA, UNSPECIFIED: ICD-10-CM

## 2021-06-11 DIAGNOSIS — M25.511 PAIN IN RIGHT SHOULDER: ICD-10-CM

## 2021-06-11 DIAGNOSIS — E03.9 HYPOTHYROIDISM, UNSPECIFIED: ICD-10-CM

## 2021-06-11 DIAGNOSIS — Z88.8 ALLERGY STATUS TO OTHER DRUGS, MEDICAMENTS AND BIOLOGICAL SUBSTANCES STATUS: ICD-10-CM

## 2021-06-11 PROCEDURE — 99283 EMERGENCY DEPT VISIT LOW MDM: CPT

## 2021-06-11 PROCEDURE — 99282 EMERGENCY DEPT VISIT SF MDM: CPT

## 2021-06-11 RX ORDER — OXYCODONE HYDROCHLORIDE 5 MG/1
1 TABLET ORAL
Qty: 10 | Refills: 0
Start: 2021-06-11 | End: 2021-06-12

## 2021-06-11 NOTE — ED STATDOCS - OBJECTIVE STATEMENT
71 y/o female with a PMHx of HLD presents to the ED c/o right shoulder pain x1 week. Pt was seen at urgent care earlier this week, was told she may have a rotator cuff tear. Had a cortisone shot which temporarily relieved her pain. Has been taking Motrin and Tylenol with no relief. Pt states she is unable to sleep due to the pain. Pt has an appt with her ortho next week, but states she is in too much pain to wait until then. Denies injury before onset of pain, states it "started out of the blue." Pt had a right biceps tear a few years ago, but otherwise has had no issues with that arm. Ortho- Dr. Johnson. Pharmacy- [facility] [address], [place].

## 2021-06-11 NOTE — ED STATDOCS - MUSCULOSKELETAL, MLM
+decreased ROM right shoulder especially with abduction, TTP lateral right shoulder, anterior right shoulder. No surrounding redness, no edema, no bony deformity.

## 2021-06-11 NOTE — ED STATDOCS - NSFOLLOWUPINSTRUCTIONS_ED_ALL_ED_FT
Shoulder Pain    AMBULATORY CARE:    Shoulder pain is a common problem that can affect your daily activities. Pain can be caused by a problem within your shoulder, such as soreness of a tendon or bursa. A tendon is a cord of tough tissue that connects your muscles to your bones. The bursa is a fluid-filled sac that acts as a cushion between a bone and a tendon. Shoulder pain may also be caused by pain that spreads to your shoulder from another part of your body.    Shoulder Anatomy         Seek care immediately if:   •You have severe pain.      •You cannot move your arm or shoulder.      •You have numbness or tingling in your shoulder or arm.      Contact your healthcare provider if:   •Your pain gets worse or does not go away with treatment.      •You have trouble moving your arm or shoulder.      •You have questions or concerns about your condition or care.      Treatment for shoulder pain may include any of the following:   •Acetaminophen decreases pain and fever. It is available without a doctor's order. Ask how much to take and how often to take it. Follow directions. Read the labels of all other medicines you are using to see if they also contain acetaminophen, or ask your doctor or pharmacist. Acetaminophen can cause liver damage if not taken correctly. Do not use more than 4 grams (4,000 milligrams) total of acetaminophen in one day.       •NSAIDs, such as ibuprofen, help decrease swelling, pain, and fever. This medicine is available with or without a doctor's order. NSAIDs can cause stomach bleeding or kidney problems in certain people. If you take blood thinner medicine, always ask your healthcare provider if NSAIDs are safe for you. Always read the medicine label and follow directions.      •A steroid injection may help decrease pain and swelling.      •Surgery may be needed for long-term pain and loss of function.      Manage your symptoms:   •Apply ice on your shoulder for 20 to 30 minutes every 2 hours or as directed. Use an ice pack, or put crushed ice in a plastic bag. Cover it with a towel before you apply it to your shoulder. Ice helps prevent tissue damage and decreases swelling and pain.      •Apply heat if ice does not help your symptoms. Apply heat on your shoulder for 20 to 30 minutes every 2 hours for as many days as directed. Heat helps decrease pain and muscle spasms.      •Limit activities as directed. Try to avoid repeated overhead movements.      •Go to physical or occupational therapy as directed. A physical therapist teaches you exercises to help improve movement and strength, and to decrease pain. An occupational therapist teaches you skills to help with your daily activities.       Prevent shoulder pain:   •Maintain a good range of motion in your shoulder. Ask your healthcare provider which exercises you should do on a regular basis after you have healed.       •Stretch and strengthen your shoulder. Use proper technique during exercises and sports.      Follow up with your healthcare provider or orthopedist as directed: Write down your questions so you remember to ask them during your visits.

## 2021-06-11 NOTE — ED ADULT TRIAGE NOTE - CHIEF COMPLAINT QUOTE
c/o right intermittent shoulder pain started last week, went to urgent care 4 days ago and sent to orthopedic urgent care where she was told she possibly have a torn rotator cuff, received cortisone injection and told to follow up with ortho, patient has not been able to follow up, c/o increased right shoulder pain radiating to right scapula, has been unable to sleep even after taking 800mg of ibuprofen and 200 mg of tylenol, requesting more pain control, denies acute injury to shoulder, patient states she has received covid vaccine

## 2021-06-11 NOTE — ED ADULT NURSE NOTE - OBJECTIVE STATEMENT
Patient comes to ED for right shoulder pain for 1 week, patient was seen at urgent care this week and told possibly had a rotator cuff tear. Had a cortisone shot which temporarily relieved her pain. Has been taking Motrin and Tylenol with no relief. Pt states she is unable to sleep due to the pain. Pt has an appt with her ortho next week, but states she is in too much pain to wait until then. Denies injury before onset of pain, states it "started out of the blue." Pt had a right biceps tear a few years ago, but otherwise has had no issues with that arm. Ortho- Dr. Johnson.

## 2021-06-11 NOTE — ED STATDOCS - PATIENT PORTAL LINK FT
You can access the FollowMyHealth Patient Portal offered by [facility] by registering at the following website: [email or web address]. By joining [employer]’s FollowMyHealth portal, you will also be able to view your health information using other applications (apps) compatible with our system.

## 2021-06-11 NOTE — ED STATDOCS - CARE PROVIDER_API CALL
Sophia Medina)  Orthopaedic Surgery; Surgery of the Hand  [address] [place] [address]  Phone: [phone]  Fax: [phone]  Follow Up Time:

## 2021-06-11 NOTE — ED STATDOCS - PROGRESS NOTE DETAILS
Pt. is a 70 year old female Hx HLD, migraine HA, presents with right shoulder pain x 1 week.  Pt. had cortisone injection with temporarily relief pain but now no relief with Motrin or Tylenol.  Pt. has orthopedic appointment next week.  Requesting additional pain medications.  Catalina Castañeda PA-C Plan to discharge with sling and short term oxycodone.  Catalina Castañeda PA-C

## 2021-06-15 PROBLEM — E78.5 HYPERLIPIDEMIA, UNSPECIFIED: Chronic | Status: ACTIVE | Noted: 2021-06-11

## 2021-06-16 ENCOUNTER — INPATIENT (INPATIENT)
Facility: HOSPITAL | Age: 71
LOS: 4 days | Discharge: HOME | DRG: 552 | End: 2021-06-21
Attending: ORTHOPAEDIC SURGERY | Admitting: ORTHOPAEDIC SURGERY
Payer: MEDICARE

## 2021-06-16 ENCOUNTER — APPOINTMENT (OUTPATIENT)
Dept: ORTHOPEDIC SURGERY | Facility: CLINIC | Age: 71
End: 2021-06-16
Payer: MEDICARE

## 2021-06-16 ENCOUNTER — EMERGENCY (EMERGENCY)
Facility: HOSPITAL | Age: 71
LOS: 0 days | Discharge: HOME | End: 2021-06-16
Attending: EMERGENCY MEDICINE
Payer: MEDICARE

## 2021-06-16 VITALS
TEMPERATURE: 98 F | HEIGHT: 61 IN | WEIGHT: 111.99 LBS | DIASTOLIC BLOOD PRESSURE: 76 MMHG | OXYGEN SATURATION: 99 % | HEART RATE: 85 BPM | RESPIRATION RATE: 18 BRPM | SYSTOLIC BLOOD PRESSURE: 140 MMHG

## 2021-06-16 VITALS
DIASTOLIC BLOOD PRESSURE: 68 MMHG | BODY MASS INDEX: 15.17 KG/M2 | WEIGHT: 112 LBS | HEIGHT: 72 IN | HEART RATE: 72 BPM | SYSTOLIC BLOOD PRESSURE: 116 MMHG

## 2021-06-16 VITALS
TEMPERATURE: 98 F | RESPIRATION RATE: 18 BRPM | DIASTOLIC BLOOD PRESSURE: 73 MMHG | SYSTOLIC BLOOD PRESSURE: 143 MMHG | OXYGEN SATURATION: 100 % | HEART RATE: 63 BPM

## 2021-06-16 VITALS — WEIGHT: 111.99 LBS | HEIGHT: 61 IN

## 2021-06-16 DIAGNOSIS — M54.16 RADICULOPATHY, LUMBAR REGION: ICD-10-CM

## 2021-06-16 DIAGNOSIS — M79.605 PAIN IN LEFT LEG: ICD-10-CM

## 2021-06-16 DIAGNOSIS — M79.652 PAIN IN LEFT THIGH: ICD-10-CM

## 2021-06-16 DIAGNOSIS — Z88.2 ALLERGY STATUS TO SULFONAMIDES: ICD-10-CM

## 2021-06-16 DIAGNOSIS — E03.9 HYPOTHYROIDISM, UNSPECIFIED: ICD-10-CM

## 2021-06-16 DIAGNOSIS — E78.5 HYPERLIPIDEMIA, UNSPECIFIED: ICD-10-CM

## 2021-06-16 LAB
ALBUMIN SERPL ELPH-MCNC: 3.9 G/DL — SIGNIFICANT CHANGE UP (ref 3.3–5)
ALP SERPL-CCNC: 71 U/L — SIGNIFICANT CHANGE UP (ref 40–120)
ALT FLD-CCNC: 30 U/L — SIGNIFICANT CHANGE UP (ref 12–78)
ANION GAP SERPL CALC-SCNC: 3 MMOL/L — LOW (ref 5–17)
APTT BLD: 30.1 SEC — SIGNIFICANT CHANGE UP (ref 27.5–35.5)
AST SERPL-CCNC: 21 U/L — SIGNIFICANT CHANGE UP (ref 15–37)
BASOPHILS # BLD AUTO: 0.08 K/UL — SIGNIFICANT CHANGE UP (ref 0–0.2)
BASOPHILS NFR BLD AUTO: 0.8 % — SIGNIFICANT CHANGE UP (ref 0–2)
BILIRUB SERPL-MCNC: 0.8 MG/DL — SIGNIFICANT CHANGE UP (ref 0.2–1.2)
BUN SERPL-MCNC: 23 MG/DL — SIGNIFICANT CHANGE UP (ref 7–23)
CALCIUM SERPL-MCNC: 8.1 MG/DL — LOW (ref 8.5–10.1)
CHLORIDE SERPL-SCNC: 106 MMOL/L — SIGNIFICANT CHANGE UP (ref 96–108)
CK SERPL-CCNC: 57 U/L — SIGNIFICANT CHANGE UP (ref 26–192)
CO2 SERPL-SCNC: 27 MMOL/L — SIGNIFICANT CHANGE UP (ref 22–31)
CREAT SERPL-MCNC: 1.03 MG/DL — SIGNIFICANT CHANGE UP (ref 0.5–1.3)
EOSINOPHIL # BLD AUTO: 0.04 K/UL — SIGNIFICANT CHANGE UP (ref 0–0.5)
EOSINOPHIL NFR BLD AUTO: 0.4 % — SIGNIFICANT CHANGE UP (ref 0–6)
ERYTHROCYTE [SEDIMENTATION RATE] IN BLOOD: 30 MM/HR — HIGH (ref 0–20)
GLUCOSE SERPL-MCNC: 110 MG/DL — HIGH (ref 70–99)
HCT VFR BLD CALC: 40.2 % — SIGNIFICANT CHANGE UP (ref 34.5–45)
HGB BLD-MCNC: 13.3 G/DL — SIGNIFICANT CHANGE UP (ref 11.5–15.5)
IMM GRANULOCYTES NFR BLD AUTO: 0.3 % — SIGNIFICANT CHANGE UP (ref 0–1.5)
INR BLD: 1.19 RATIO — HIGH (ref 0.88–1.16)
LACTATE SERPL-SCNC: 0.6 MMOL/L — LOW (ref 0.7–2)
LYMPHOCYTES # BLD AUTO: 1.07 K/UL — SIGNIFICANT CHANGE UP (ref 1–3.3)
LYMPHOCYTES # BLD AUTO: 10.9 % — LOW (ref 13–44)
MCHC RBC-ENTMCNC: 29.4 PG — SIGNIFICANT CHANGE UP (ref 27–34)
MCHC RBC-ENTMCNC: 33.1 GM/DL — SIGNIFICANT CHANGE UP (ref 32–36)
MCV RBC AUTO: 88.7 FL — SIGNIFICANT CHANGE UP (ref 80–100)
MONOCYTES # BLD AUTO: 0.56 K/UL — SIGNIFICANT CHANGE UP (ref 0–0.9)
MONOCYTES NFR BLD AUTO: 5.7 % — SIGNIFICANT CHANGE UP (ref 2–14)
NEUTROPHILS # BLD AUTO: 8.05 K/UL — HIGH (ref 1.8–7.4)
NEUTROPHILS NFR BLD AUTO: 81.9 % — HIGH (ref 43–77)
PLATELET # BLD AUTO: 441 K/UL — HIGH (ref 150–400)
POTASSIUM SERPL-MCNC: 4.1 MMOL/L — SIGNIFICANT CHANGE UP (ref 3.5–5.3)
POTASSIUM SERPL-SCNC: 4.1 MMOL/L — SIGNIFICANT CHANGE UP (ref 3.5–5.3)
PROT SERPL-MCNC: 7.5 GM/DL — SIGNIFICANT CHANGE UP (ref 6–8.3)
PROTHROM AB SERPL-ACNC: 13.7 SEC — HIGH (ref 10.6–13.6)
RBC # BLD: 4.53 M/UL — SIGNIFICANT CHANGE UP (ref 3.8–5.2)
RBC # FLD: 12.6 % — SIGNIFICANT CHANGE UP (ref 10.3–14.5)
SARS-COV-2 RNA SPEC QL NAA+PROBE: SIGNIFICANT CHANGE UP
SODIUM SERPL-SCNC: 136 MMOL/L — SIGNIFICANT CHANGE UP (ref 135–145)
WBC # BLD: 9.83 K/UL — SIGNIFICANT CHANGE UP (ref 3.8–10.5)
WBC # FLD AUTO: 9.83 K/UL — SIGNIFICANT CHANGE UP (ref 3.8–10.5)

## 2021-06-16 PROCEDURE — 78306 BONE IMAGING WHOLE BODY: CPT

## 2021-06-16 PROCEDURE — 74177 CT ABD & PELVIS W/CONTRAST: CPT

## 2021-06-16 PROCEDURE — 93010 ELECTROCARDIOGRAM REPORT: CPT

## 2021-06-16 PROCEDURE — 85730 THROMBOPLASTIN TIME PARTIAL: CPT

## 2021-06-16 PROCEDURE — 78830 RP LOCLZJ TUM SPECT W/CT 1: CPT

## 2021-06-16 PROCEDURE — 86769 SARS-COV-2 COVID-19 ANTIBODY: CPT

## 2021-06-16 PROCEDURE — 72148 MRI LUMBAR SPINE W/O DYE: CPT

## 2021-06-16 PROCEDURE — 80048 BASIC METABOLIC PNL TOTAL CA: CPT

## 2021-06-16 PROCEDURE — 93971 EXTREMITY STUDY: CPT | Mod: 26,LT

## 2021-06-16 PROCEDURE — 99285 EMERGENCY DEPT VISIT HI MDM: CPT

## 2021-06-16 PROCEDURE — 86803 HEPATITIS C AB TEST: CPT

## 2021-06-16 PROCEDURE — 99284 EMERGENCY DEPT VISIT MOD MDM: CPT

## 2021-06-16 PROCEDURE — 85610 PROTHROMBIN TIME: CPT

## 2021-06-16 PROCEDURE — 74177 CT ABD & PELVIS W/CONTRAST: CPT | Mod: 26

## 2021-06-16 PROCEDURE — 73562 X-RAY EXAM OF KNEE 3: CPT | Mod: 26,LT

## 2021-06-16 PROCEDURE — 73552 X-RAY EXAM OF FEMUR 2/>: CPT | Mod: 26,LT

## 2021-06-16 PROCEDURE — 73502 X-RAY EXAM HIP UNI 2-3 VIEWS: CPT | Mod: 26,LT

## 2021-06-16 PROCEDURE — 36415 COLL VENOUS BLD VENIPUNCTURE: CPT

## 2021-06-16 PROCEDURE — 99212 OFFICE O/P EST SF 10 MIN: CPT

## 2021-06-16 PROCEDURE — A9561: CPT

## 2021-06-16 PROCEDURE — 85027 COMPLETE CBC AUTOMATED: CPT

## 2021-06-16 PROCEDURE — 72132 CT LUMBAR SPINE W/DYE: CPT | Mod: 26

## 2021-06-16 PROCEDURE — 87040 BLOOD CULTURE FOR BACTERIA: CPT

## 2021-06-16 RX ORDER — ONDANSETRON 8 MG/1
4 TABLET, FILM COATED ORAL ONCE
Refills: 0 | Status: COMPLETED | OUTPATIENT
Start: 2021-06-16 | End: 2021-06-16

## 2021-06-16 RX ORDER — SODIUM CHLORIDE 9 MG/ML
1000 INJECTION, SOLUTION INTRAVENOUS
Refills: 0 | Status: DISCONTINUED | OUTPATIENT
Start: 2021-06-16 | End: 2021-06-21

## 2021-06-16 RX ORDER — LIDOCAINE 4 G/100G
1 CREAM TOPICAL ONCE
Refills: 0 | Status: COMPLETED | OUTPATIENT
Start: 2021-06-16 | End: 2021-06-16

## 2021-06-16 RX ORDER — MORPHINE SULFATE 50 MG/1
4 CAPSULE, EXTENDED RELEASE ORAL ONCE
Refills: 0 | Status: DISCONTINUED | OUTPATIENT
Start: 2021-06-16 | End: 2021-06-16

## 2021-06-16 RX ORDER — DIAZEPAM 5 MG
5 TABLET ORAL ONCE
Refills: 0 | Status: DISCONTINUED | OUTPATIENT
Start: 2021-06-16 | End: 2021-06-16

## 2021-06-16 RX ORDER — OXYCODONE HYDROCHLORIDE 5 MG/1
10 TABLET ORAL EVERY 4 HOURS
Refills: 0 | Status: DISCONTINUED | OUTPATIENT
Start: 2021-06-16 | End: 2021-06-21

## 2021-06-16 RX ORDER — SODIUM CHLORIDE 9 MG/ML
1000 INJECTION INTRAMUSCULAR; INTRAVENOUS; SUBCUTANEOUS ONCE
Refills: 0 | Status: COMPLETED | OUTPATIENT
Start: 2021-06-16 | End: 2021-06-16

## 2021-06-16 RX ORDER — FAMOTIDINE 10 MG/ML
20 INJECTION INTRAVENOUS EVERY 12 HOURS
Refills: 0 | Status: DISCONTINUED | OUTPATIENT
Start: 2021-06-16 | End: 2021-06-21

## 2021-06-16 RX ORDER — HEPARIN SODIUM 5000 [USP'U]/ML
5000 INJECTION INTRAVENOUS; SUBCUTANEOUS EVERY 8 HOURS
Refills: 0 | Status: COMPLETED | OUTPATIENT
Start: 2021-06-16 | End: 2021-06-16

## 2021-06-16 RX ORDER — DIAZEPAM 5 MG
1 TABLET ORAL
Qty: 9 | Refills: 0
Start: 2021-06-16 | End: 2021-06-18

## 2021-06-16 RX ORDER — HYDROMORPHONE HYDROCHLORIDE 2 MG/ML
0.5 INJECTION INTRAMUSCULAR; INTRAVENOUS; SUBCUTANEOUS ONCE
Refills: 0 | Status: DISCONTINUED | OUTPATIENT
Start: 2021-06-16 | End: 2021-06-16

## 2021-06-16 RX ORDER — ASCORBIC ACID 60 MG
500 TABLET,CHEWABLE ORAL
Refills: 0 | Status: DISCONTINUED | OUTPATIENT
Start: 2021-06-16 | End: 2021-06-21

## 2021-06-16 RX ORDER — MORPHINE SULFATE 50 MG/1
2 CAPSULE, EXTENDED RELEASE ORAL ONCE
Refills: 0 | Status: DISCONTINUED | OUTPATIENT
Start: 2021-06-16 | End: 2021-06-16

## 2021-06-16 RX ORDER — ZOLPIDEM TARTRATE 10 MG/1
5 TABLET ORAL AT BEDTIME
Refills: 0 | Status: DISCONTINUED | OUTPATIENT
Start: 2021-06-16 | End: 2021-06-21

## 2021-06-16 RX ORDER — FOLIC ACID 0.8 MG
1 TABLET ORAL DAILY
Refills: 0 | Status: DISCONTINUED | OUTPATIENT
Start: 2021-06-16 | End: 2021-06-21

## 2021-06-16 RX ORDER — HYDROMORPHONE HYDROCHLORIDE 2 MG/ML
1 INJECTION INTRAMUSCULAR; INTRAVENOUS; SUBCUTANEOUS EVERY 6 HOURS
Refills: 0 | Status: DISCONTINUED | OUTPATIENT
Start: 2021-06-16 | End: 2021-06-21

## 2021-06-16 RX ORDER — KETOROLAC TROMETHAMINE 30 MG/ML
30 SYRINGE (ML) INJECTION ONCE
Refills: 0 | Status: DISCONTINUED | OUTPATIENT
Start: 2021-06-16 | End: 2021-06-16

## 2021-06-16 RX ORDER — LIDOCAINE 4 G/100G
1 CREAM TOPICAL
Qty: 10 | Refills: 0
Start: 2021-06-16 | End: 2021-06-25

## 2021-06-16 RX ORDER — OXYCODONE HYDROCHLORIDE 5 MG/1
5 TABLET ORAL EVERY 4 HOURS
Refills: 0 | Status: DISCONTINUED | OUTPATIENT
Start: 2021-06-16 | End: 2021-06-21

## 2021-06-16 RX ADMIN — ONDANSETRON 4 MILLIGRAM(S): 8 TABLET, FILM COATED ORAL at 20:01

## 2021-06-16 RX ADMIN — HEPARIN SODIUM 5000 UNIT(S): 5000 INJECTION INTRAVENOUS; SUBCUTANEOUS at 21:40

## 2021-06-16 RX ADMIN — MORPHINE SULFATE 4 MILLIGRAM(S): 50 CAPSULE, EXTENDED RELEASE ORAL at 07:32

## 2021-06-16 RX ADMIN — SODIUM CHLORIDE 1000 MILLILITER(S): 9 INJECTION INTRAMUSCULAR; INTRAVENOUS; SUBCUTANEOUS at 21:39

## 2021-06-16 RX ADMIN — Medication 5 MILLIGRAM(S): at 07:26

## 2021-06-16 RX ADMIN — Medication 30 MILLIGRAM(S): at 05:45

## 2021-06-16 RX ADMIN — OXYCODONE HYDROCHLORIDE 10 MILLIGRAM(S): 5 TABLET ORAL at 22:48

## 2021-06-16 RX ADMIN — ONDANSETRON 4 MILLIGRAM(S): 8 TABLET, FILM COATED ORAL at 05:45

## 2021-06-16 RX ADMIN — HYDROMORPHONE HYDROCHLORIDE 0.5 MILLIGRAM(S): 2 INJECTION INTRAMUSCULAR; INTRAVENOUS; SUBCUTANEOUS at 20:00

## 2021-06-16 RX ADMIN — ONDANSETRON 4 MILLIGRAM(S): 8 TABLET, FILM COATED ORAL at 21:40

## 2021-06-16 RX ADMIN — OXYCODONE HYDROCHLORIDE 10 MILLIGRAM(S): 5 TABLET ORAL at 21:38

## 2021-06-16 RX ADMIN — SODIUM CHLORIDE 1000 MILLILITER(S): 9 INJECTION INTRAMUSCULAR; INTRAVENOUS; SUBCUTANEOUS at 20:01

## 2021-06-16 RX ADMIN — MORPHINE SULFATE 2 MILLIGRAM(S): 50 CAPSULE, EXTENDED RELEASE ORAL at 05:45

## 2021-06-16 RX ADMIN — LIDOCAINE 1 PATCH: 4 CREAM TOPICAL at 07:26

## 2021-06-16 RX ADMIN — HYDROMORPHONE HYDROCHLORIDE 0.5 MILLIGRAM(S): 2 INJECTION INTRAMUSCULAR; INTRAVENOUS; SUBCUTANEOUS at 21:42

## 2021-06-16 RX ADMIN — HYDROMORPHONE HYDROCHLORIDE 1 MILLIGRAM(S): 2 INJECTION INTRAMUSCULAR; INTRAVENOUS; SUBCUTANEOUS at 22:47

## 2021-06-16 NOTE — ED ADULT NURSE NOTE - NS ED NURSE RECORD ANOTHER VITAL SIGN
Problem: PHYSICAL THERAPY ADULT  Goal: Performs mobility at highest level of function for planned discharge setting  See evaluation for individualized goals  Treatment/Interventions: Functional transfer training, LE strengthening/ROM, Therapeutic exercise, Endurance training, Patient/family training, Equipment eval/education, Bed mobility, Gait training, Compensatory technique education, Continued evaluation, Spoke to nursing, OT, Spoke to case management  Equipment Recommended: Ihsan Dennison       See flowsheet documentation for full assessment, interventions and recommendations  Prognosis: Fair  Problem List: Decreased strength, Decreased endurance, Impaired balance, Decreased mobility, Decreased cognition, Impaired judgement, Decreased safety awareness  Assessment: Pt is 78 y/o female admitted with encephalopathy after being found down at home  Ultimately required intubation for respiratory insufficiency  Now extubated and PT consulted  PT presents with decreased overall strength, balance, activity tolerance and mobilty  On 6L desaturates with mobiltiy to 87%  Requires min to modA of 2 with mobilty and use of RW for support  Baseline independent function without AD per pt  Noted that pt expressing desire to die ( ICU staff aware of same with pt wishes)  States was not eating at home prior to admission  At present given impairments with mobilty will require ongoing PT in order to progress as well as recommending STR upon d/c from hospital to address impairments  Will follow and progress  Recommendation: Short-term skilled PT, Post acute IP rehab     PT - OK to Discharge:  (yes to rhb, no to home when medically clear )    See flowsheet documentation for full assessment  Yes

## 2021-06-16 NOTE — ED PROVIDER NOTE - PATIENT PORTAL LINK FT
You can access the FollowMyHealth Patient Portal offered by [facility] by registering at the following website: [email or web address]. By joining [facility]’s FollowMyHealth portal, you will also be able to view your health information using other applications (apps) compatible with our system.

## 2021-06-16 NOTE — ED ADULT TRIAGE NOTE - CHIEF COMPLAINT QUOTE
pt c/o throbbing pain in L thigh, denies injuries or fall. pt c/o throbbing pain in L thigh, denies sob, injuries or fall.

## 2021-06-16 NOTE — ED PROVIDER NOTE - PHYSICAL EXAMINATION
Gen:  Well appearning in severe distress  Head:  NC/AT  Resp: No distress   Ext: no deformities, right shoulder in a sling, left anterior thigh without ttp, no calf ttp, no midline ctls ttp no step offs or deformities no paralumbar spasm  Skin: warm and dry as visualized, no rash

## 2021-06-16 NOTE — ED ADULT TRIAGE NOTE - CHIEF COMPLAINT QUOTE
Patient comes in with left thigh pain x1 day. Patient went to orthopedist who sent her here for pain control and MRI tomorrow. Denies any other symptoms.

## 2021-06-16 NOTE — ED ADULT NURSE REASSESSMENT NOTE - NS ED NURSE REASSESS COMMENT FT1
patient crying in pain d/t left thigh pain, medicated with valium, morphine and lidocaine.  Will monitor for effect

## 2021-06-16 NOTE — H&P ADULT - NSHPLABSRESULTS_GEN_ALL_CORE
Labs/COVID/BCx/ESR/CRP ordered    CT A/P ordered per ED attending    MRI LSp ordered with anesthesia sedation due to severe claustophobia    No XR imaging needed as obtained in office today with Dr. Rao

## 2021-06-16 NOTE — ED PROVIDER NOTE - PROGRESS NOTE DETAILS
Koby TOMLINSON: Received s/o from Dr. Garcia; patient feeling better at this time after valium; doppler, xray negatives; instructed to f/u with Dr. Cerrato today at her 1:40pm appt. strict return precautions given.

## 2021-06-16 NOTE — ED ADULT NURSE NOTE - OBJECTIVE STATEMENT
pt. presents to ED ambulatory c/o pain to left thigh. pain began yesterday and has been constant since. no redness or swelling note. pt. denies injury, no recent falls. pt. had recent R shoulder injury and has been less active because of it. pt. c/o nausea due to pain. No orther complains.

## 2021-06-16 NOTE — ED STATDOCS - OBJECTIVE STATEMENT
69 y/o F PMHx R rotator cuff tear treated on 6/11 in ED w/ oxycodone, no injury. pt states yesterday evening she developed severe pain in L thigh starting in back and radiating to L knee. No fever, nausea or vomiting. Pt states she tried motrin and tylenol w/o relief. Came to ED and had doppler, XR, all negative and sent home. Pt went to see Dr. Cerrato (ortho) and then referred to dr. Rao (spine) who sent to ED for admission for MRI under sedation. Pt c/o severe pain in her thigh, pt is sobbing very upset.

## 2021-06-16 NOTE — ED STATDOCS - PROGRESS NOTE DETAILS
Spoke to Dr. Rao who states will call resident to admit patient. Manuel WAGONER Seen by Dr. Rao. Will have Ortho resident evaluate patient in ED. Merrill OBANDO

## 2021-06-16 NOTE — ED PROVIDER NOTE - OBJECTIVE STATEMENT
71 yo female pw acute onset of anterior left thigh pain tonight. This evening pt started to have thighpain, denies any trauma, Pt is currently being treated for possible right shoulder rotator cuff injury so she took motrin and vicodin tonight without improvement of pain inher thigh. She denies back pain or ho back probllems

## 2021-06-16 NOTE — ED STATDOCS - MUSCULOSKELETAL, MLM
range of motion is not limited and there is no muscle tenderness. pt walking, rubbing thigh, no swelling or tenderness of thigh, 2 mm vesicles in L posterior sacral area

## 2021-06-16 NOTE — ED STATDOCS - DISPOSITION TYPE
Spoke with Dr Downs, he reviewed the results and stated the appointment in may will be fine.  I called the pt, spoke with his wife and they agreed that May 24 is fine.  I told them to call the clinic if something comes up and we can always try to get them in sooner if needed.    Clare Pemberton, CMA     ADMIT

## 2021-06-16 NOTE — H&P ADULT - NSHPPHYSICALEXAM_GEN_ALL_CORE
PE:  General: Awake, alert, moderate to severe painful distress  Spine:  Skin intact. No obvious abrasions/lacerations. No visualized deformity  TTP throughout midline LSp. No C/T spine TTP. No palpable step off or deformity.   Negative Babinski. Negative Clonus. Negative Huntley  Able to SLR bilaterally with pain  DP/Radial pulses intact  Compartments soft and compressible  No calf tenderness bilaterally    Motor:                   C5                C6              C7               C8           T1   R            5/5                5/5            5/5             5/5          5/5  L             5/5               5/5             5/5             5/5          5/5                L2             L3             L4               L5            S1  R         5/5           5/5          5/5             5/5           5/5  L          4/5          5/5           5/5             5/5           5/5    Sensory:            C5         C6         C7      C8       T1        (0=absent, 1=impaired, 2=normal, NT=not testable)  R         2            2           2        2         2  L          2            2           2        2         2               L2          L3         L4      L5       S1         (0=absent, 1=impaired, 2=normal, NT=not testable)  R         2            2            2        2        2  L          2            2           2        2         2

## 2021-06-16 NOTE — H&P ADULT - ASSESSMENT
70F with severe left sided lumbar radiculopathy; L5/S1 isthmic spondylithiasis    Plan:  CT imaging obtained by ED reviewed with G1 anterior spondy L4/5, HNPs L3-S1 that flatten thecal sac and cause narrowing of BL foramina, L paracentral HNP L4/5 that further narrows L foramina  No XR imaging required due to obtained in office   MRI Lspine ordered with sedation by anesthesia to be performed tomorrow  NPO after midnight except medication for sedation with MRI tomorrow  Pain control PRN  Hold DVT ppx after midnight in event that OR is required; SCDs okay; Heparin x1 given prior to midnight  FU Labs/BCx/ESR/CRP/COVID  Further management to be dictated by MRI results tomorrow morning  Will discuss with Dr. Rao and advise if any changes to plan

## 2021-06-16 NOTE — ED ADULT NURSE NOTE - OBJECTIVE STATEMENT
Patient A&Ox3, presents to ED c/o left upper leg pain x1 days. Pt saw an orthopedist and spine specialist today, who sent her to the ED for pain control and an MRI under sedation. Pt denies any fall or recent trauma, no numbness/tingling.

## 2021-06-16 NOTE — ED PROVIDER NOTE - CLINICAL SUMMARY MEDICAL DECISION MAKING FREE TEXT BOX
pt with left thigh pain, no ho trauma, no rash will give pain medications and then get xray hip and femur

## 2021-06-16 NOTE — H&P ADULT - HISTORY OF PRESENT ILLNESS
70F presents to [facility] Emergency Department for evaluation of lumbar radiculopathy. Patient was evaluated by Dr. Rao in the office for left thigh pain with onset 24 hours prior to evaluation. She reports atraumatic sudden onset left anterior thigh pain. Patient reports that the pain has continued to progress. 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 Patient had XR in Dr. Rao office today that revealed L5/S1 isthmic spondylithiasis, and advised to come to ED for evaluation of intractable pain and MRI in the morning with sedation due to severe claustrophobia. Otherwise she denies fever/chills, numbness/tingling, weakness, loss of bowel/bladder control, saddle anesthesia, or any other acute orthopedic complaints.

## 2021-06-17 DIAGNOSIS — M79.605 PAIN IN LEFT LEG: ICD-10-CM

## 2021-06-17 LAB
ANION GAP SERPL CALC-SCNC: 2 MMOL/L — LOW (ref 5–17)
APTT BLD: 30 SEC — SIGNIFICANT CHANGE UP (ref 27.5–35.5)
BUN SERPL-MCNC: 14 MG/DL — SIGNIFICANT CHANGE UP (ref 7–23)
CALCIUM SERPL-MCNC: 7.3 MG/DL — LOW (ref 8.5–10.1)
CHLORIDE SERPL-SCNC: 110 MMOL/L — HIGH (ref 96–108)
CO2 SERPL-SCNC: 25 MMOL/L — SIGNIFICANT CHANGE UP (ref 22–31)
COVID-19 SPIKE DOMAIN AB INTERP: POSITIVE
COVID-19 SPIKE DOMAIN ANTIBODY RESULT: >250 U/ML — HIGH
CREAT SERPL-MCNC: 0.7 MG/DL — SIGNIFICANT CHANGE UP (ref 0.5–1.3)
CRP SERPL-MCNC: 10 MG/L — HIGH
GLUCOSE SERPL-MCNC: 93 MG/DL — SIGNIFICANT CHANGE UP (ref 70–99)
HCT VFR BLD CALC: 37.8 % — SIGNIFICANT CHANGE UP (ref 34.5–45)
HCV AB S/CO SERPL IA: 0.1 S/CO — SIGNIFICANT CHANGE UP (ref 0–0.99)
HCV AB SERPL-IMP: SIGNIFICANT CHANGE UP
HGB BLD-MCNC: 12.3 G/DL — SIGNIFICANT CHANGE UP (ref 11.5–15.5)
INR BLD: 1.16 RATIO — SIGNIFICANT CHANGE UP (ref 0.88–1.16)
MCHC RBC-ENTMCNC: 29.4 PG — SIGNIFICANT CHANGE UP (ref 27–34)
MCHC RBC-ENTMCNC: 32.5 GM/DL — SIGNIFICANT CHANGE UP (ref 32–36)
MCV RBC AUTO: 90.4 FL — SIGNIFICANT CHANGE UP (ref 80–100)
PLATELET # BLD AUTO: 357 K/UL — SIGNIFICANT CHANGE UP (ref 150–400)
POTASSIUM SERPL-MCNC: 4.4 MMOL/L — SIGNIFICANT CHANGE UP (ref 3.5–5.3)
POTASSIUM SERPL-SCNC: 4.4 MMOL/L — SIGNIFICANT CHANGE UP (ref 3.5–5.3)
PROTHROM AB SERPL-ACNC: 13.5 SEC — SIGNIFICANT CHANGE UP (ref 10.6–13.6)
RBC # BLD: 4.18 M/UL — SIGNIFICANT CHANGE UP (ref 3.8–5.2)
RBC # FLD: 12.7 % — SIGNIFICANT CHANGE UP (ref 10.3–14.5)
SARS-COV-2 IGG+IGM SERPL QL IA: >250 U/ML — HIGH
SARS-COV-2 IGG+IGM SERPL QL IA: POSITIVE
SODIUM SERPL-SCNC: 137 MMOL/L — SIGNIFICANT CHANGE UP (ref 135–145)
WBC # BLD: 8.9 K/UL — SIGNIFICANT CHANGE UP (ref 3.8–10.5)
WBC # FLD AUTO: 8.9 K/UL — SIGNIFICANT CHANGE UP (ref 3.8–10.5)

## 2021-06-17 PROCEDURE — 72148 MRI LUMBAR SPINE W/O DYE: CPT | Mod: 26

## 2021-06-17 RX ORDER — ONDANSETRON 8 MG/1
4 TABLET, FILM COATED ORAL ONCE
Refills: 0 | Status: DISCONTINUED | OUTPATIENT
Start: 2021-06-17 | End: 2021-06-17

## 2021-06-17 RX ORDER — OXYCODONE HYDROCHLORIDE 5 MG/1
5 TABLET ORAL ONCE
Refills: 0 | Status: DISCONTINUED | OUTPATIENT
Start: 2021-06-17 | End: 2021-06-17

## 2021-06-17 RX ORDER — DULOXETINE HYDROCHLORIDE 30 MG/1
60 CAPSULE, DELAYED RELEASE ORAL DAILY
Refills: 0 | Status: DISCONTINUED | OUTPATIENT
Start: 2021-06-17 | End: 2021-06-17

## 2021-06-17 RX ORDER — SODIUM CHLORIDE 9 MG/ML
1000 INJECTION INTRAMUSCULAR; INTRAVENOUS; SUBCUTANEOUS
Refills: 0 | Status: DISCONTINUED | OUTPATIENT
Start: 2021-06-17 | End: 2021-06-17

## 2021-06-17 RX ORDER — ONDANSETRON 8 MG/1
4 TABLET, FILM COATED ORAL ONCE
Refills: 0 | Status: COMPLETED | OUTPATIENT
Start: 2021-06-17 | End: 2021-06-17

## 2021-06-17 RX ORDER — LEVOTHYROXINE SODIUM 125 MCG
100 TABLET ORAL DAILY
Refills: 0 | Status: DISCONTINUED | OUTPATIENT
Start: 2021-06-17 | End: 2021-06-21

## 2021-06-17 RX ORDER — DULOXETINE HYDROCHLORIDE 30 MG/1
80 CAPSULE, DELAYED RELEASE ORAL DAILY
Refills: 0 | Status: DISCONTINUED | OUTPATIENT
Start: 2021-06-17 | End: 2021-06-21

## 2021-06-17 RX ADMIN — HYDROMORPHONE HYDROCHLORIDE 1 MILLIGRAM(S): 2 INJECTION INTRAMUSCULAR; INTRAVENOUS; SUBCUTANEOUS at 16:53

## 2021-06-17 RX ADMIN — HYDROMORPHONE HYDROCHLORIDE 1 MILLIGRAM(S): 2 INJECTION INTRAMUSCULAR; INTRAVENOUS; SUBCUTANEOUS at 05:29

## 2021-06-17 RX ADMIN — HYDROMORPHONE HYDROCHLORIDE 1 MILLIGRAM(S): 2 INJECTION INTRAMUSCULAR; INTRAVENOUS; SUBCUTANEOUS at 11:20

## 2021-06-17 RX ADMIN — OXYCODONE HYDROCHLORIDE 10 MILLIGRAM(S): 5 TABLET ORAL at 03:45

## 2021-06-17 RX ADMIN — Medication 1 TABLET(S): at 08:50

## 2021-06-17 RX ADMIN — HYDROMORPHONE HYDROCHLORIDE 1 MILLIGRAM(S): 2 INJECTION INTRAMUSCULAR; INTRAVENOUS; SUBCUTANEOUS at 10:57

## 2021-06-17 RX ADMIN — DULOXETINE HYDROCHLORIDE 80 MILLIGRAM(S): 30 CAPSULE, DELAYED RELEASE ORAL at 08:50

## 2021-06-17 RX ADMIN — HYDROMORPHONE HYDROCHLORIDE 1 MILLIGRAM(S): 2 INJECTION INTRAMUSCULAR; INTRAVENOUS; SUBCUTANEOUS at 17:23

## 2021-06-17 RX ADMIN — Medication 500 MILLIGRAM(S): at 22:56

## 2021-06-17 RX ADMIN — OXYCODONE HYDROCHLORIDE 10 MILLIGRAM(S): 5 TABLET ORAL at 09:20

## 2021-06-17 RX ADMIN — HYDROMORPHONE HYDROCHLORIDE 1 MILLIGRAM(S): 2 INJECTION INTRAMUSCULAR; INTRAVENOUS; SUBCUTANEOUS at 22:56

## 2021-06-17 RX ADMIN — Medication 500 MILLIGRAM(S): at 08:50

## 2021-06-17 RX ADMIN — OXYCODONE HYDROCHLORIDE 10 MILLIGRAM(S): 5 TABLET ORAL at 08:51

## 2021-06-17 RX ADMIN — OXYCODONE HYDROCHLORIDE 10 MILLIGRAM(S): 5 TABLET ORAL at 03:15

## 2021-06-17 RX ADMIN — HYDROMORPHONE HYDROCHLORIDE 1 MILLIGRAM(S): 2 INJECTION INTRAMUSCULAR; INTRAVENOUS; SUBCUTANEOUS at 23:16

## 2021-06-17 RX ADMIN — Medication 1 MILLIGRAM(S): at 08:50

## 2021-06-17 RX ADMIN — Medication 100 MICROGRAM(S): at 05:00

## 2021-06-17 RX ADMIN — FAMOTIDINE 20 MILLIGRAM(S): 10 INJECTION INTRAVENOUS at 22:56

## 2021-06-17 RX ADMIN — HYDROMORPHONE HYDROCHLORIDE 1 MILLIGRAM(S): 2 INJECTION INTRAMUSCULAR; INTRAVENOUS; SUBCUTANEOUS at 04:59

## 2021-06-17 RX ADMIN — ONDANSETRON 4 MILLIGRAM(S): 8 TABLET, FILM COATED ORAL at 17:02

## 2021-06-17 NOTE — PROGRESS NOTE ADULT - SUBJECTIVE AND OBJECTIVE BOX
c/o LBP w radiation t o left ant thigh  sl weakness left hip flexor...? due to pain    + fem stretch      ct scan  HNP L45 left  spondy L5S1

## 2021-06-18 PROCEDURE — 78306 BONE IMAGING WHOLE BODY: CPT | Mod: 26

## 2021-06-18 PROCEDURE — 78830 RP LOCLZJ TUM SPECT W/CT 1: CPT | Mod: 26

## 2021-06-18 PROCEDURE — 99221 1ST HOSP IP/OBS SF/LOW 40: CPT

## 2021-06-18 RX ORDER — ACETAMINOPHEN 500 MG
1000 TABLET ORAL ONCE
Refills: 0 | Status: COMPLETED | OUTPATIENT
Start: 2021-06-18 | End: 2021-06-18

## 2021-06-18 RX ORDER — DEXAMETHASONE 0.5 MG/5ML
8 ELIXIR ORAL EVERY 8 HOURS
Refills: 0 | Status: DISCONTINUED | OUTPATIENT
Start: 2021-06-19 | End: 2021-06-19

## 2021-06-18 RX ORDER — DEXAMETHASONE 0.5 MG/5ML
ELIXIR ORAL
Refills: 0 | Status: DISCONTINUED | OUTPATIENT
Start: 2021-06-18 | End: 2021-06-19

## 2021-06-18 RX ORDER — DIAZEPAM 5 MG
10 TABLET ORAL ONCE
Refills: 0 | Status: DISCONTINUED | OUTPATIENT
Start: 2021-06-18 | End: 2021-06-18

## 2021-06-18 RX ORDER — DEXAMETHASONE 0.5 MG/5ML
10 ELIXIR ORAL ONCE
Refills: 0 | Status: COMPLETED | OUTPATIENT
Start: 2021-06-18 | End: 2021-06-18

## 2021-06-18 RX ADMIN — Medication 100 MICROGRAM(S): at 04:53

## 2021-06-18 RX ADMIN — DULOXETINE HYDROCHLORIDE 80 MILLIGRAM(S): 30 CAPSULE, DELAYED RELEASE ORAL at 09:49

## 2021-06-18 RX ADMIN — Medication 400 MILLIGRAM(S): at 03:43

## 2021-06-18 RX ADMIN — Medication 75 MILLIGRAM(S): at 21:26

## 2021-06-18 RX ADMIN — HYDROMORPHONE HYDROCHLORIDE 1 MILLIGRAM(S): 2 INJECTION INTRAMUSCULAR; INTRAVENOUS; SUBCUTANEOUS at 04:53

## 2021-06-18 RX ADMIN — Medication 500 MILLIGRAM(S): at 21:26

## 2021-06-18 RX ADMIN — Medication 500 MILLIGRAM(S): at 09:49

## 2021-06-18 RX ADMIN — HYDROMORPHONE HYDROCHLORIDE 1 MILLIGRAM(S): 2 INJECTION INTRAMUSCULAR; INTRAVENOUS; SUBCUTANEOUS at 22:54

## 2021-06-18 RX ADMIN — Medication 1 MILLIGRAM(S): at 09:49

## 2021-06-18 RX ADMIN — FAMOTIDINE 20 MILLIGRAM(S): 10 INJECTION INTRAVENOUS at 09:49

## 2021-06-18 RX ADMIN — HYDROMORPHONE HYDROCHLORIDE 1 MILLIGRAM(S): 2 INJECTION INTRAMUSCULAR; INTRAVENOUS; SUBCUTANEOUS at 17:01

## 2021-06-18 RX ADMIN — HYDROMORPHONE HYDROCHLORIDE 1 MILLIGRAM(S): 2 INJECTION INTRAMUSCULAR; INTRAVENOUS; SUBCUTANEOUS at 05:13

## 2021-06-18 RX ADMIN — FAMOTIDINE 20 MILLIGRAM(S): 10 INJECTION INTRAVENOUS at 21:26

## 2021-06-18 RX ADMIN — Medication 1000 MILLIGRAM(S): at 04:03

## 2021-06-18 RX ADMIN — HYDROMORPHONE HYDROCHLORIDE 1 MILLIGRAM(S): 2 INJECTION INTRAMUSCULAR; INTRAVENOUS; SUBCUTANEOUS at 10:54

## 2021-06-18 RX ADMIN — OXYCODONE HYDROCHLORIDE 10 MILLIGRAM(S): 5 TABLET ORAL at 02:51

## 2021-06-18 RX ADMIN — Medication 10 MILLIGRAM(S): at 13:09

## 2021-06-18 RX ADMIN — Medication 102 MILLIGRAM(S): at 21:25

## 2021-06-18 RX ADMIN — HYDROMORPHONE HYDROCHLORIDE 1 MILLIGRAM(S): 2 INJECTION INTRAMUSCULAR; INTRAVENOUS; SUBCUTANEOUS at 23:25

## 2021-06-18 RX ADMIN — OXYCODONE HYDROCHLORIDE 10 MILLIGRAM(S): 5 TABLET ORAL at 03:40

## 2021-06-18 RX ADMIN — Medication 1 TABLET(S): at 09:49

## 2021-06-18 NOTE — CONSULT NOTE ADULT - SUBJECTIVE AND OBJECTIVE BOX
CC- lumbar radiculopathy    HPI:  71yo/F with PMH depression, hypothyroidism, hyperlipidemia presented for evaluation of Lt sciatica. Patient had symptoms at the left thigh and was seen by DR Rao in the office. Admitted here for further work up. Medical consult called for medical management    PMH- as above  PSH- denies  Soc hx0 denies smoking, alcohol socially  Fam hx- not significant    6/18/21- seen earlier at the day. symptoms improving. Going for bone scan    Review of system- All 10 systems reviewed and is as per HPI otherwise negative.     T(C): 37.1 (06-18-21 @ 08:21), Max: 37.1 (06-18-21 @ 08:21)  HR: 66 (06-18-21 @ 08:21) (66 - 75)  BP: 135/66 (06-18-21 @ 08:21) (129/59 - 135/66)  RR: 18 (06-18-21 @ 08:21) (18 - 18)  SpO2: 98% (06-18-21 @ 08:21) (98% - 98%)  Wt(kg): --    LABS:                        12.3   8.90  )-----------( 357      ( 17 Jun 2021 04:51 )             37.8     06-17    137  |  110<H>  |  14  ----------------------------<  93  4.4   |  25  |  0.70    Ca    7.3<L>      17 Jun 2021 04:51    TPro  7.5  /  Alb  3.9  /  TBili  0.8  /  DBili  x   /  AST  21  /  ALT  30  /  AlkPhos  71  06-16    PT/INR - ( 17 Jun 2021 04:51 )   PT: 13.5 sec;   INR: 1.16 ratio       PTT - ( 17 Jun 2021 04:51 )  PTT:30.0 sec    CARDIAC MARKERS ( 16 Jun 2021 19:23 )  x     / x     / 57 U/L / x     / x        RADIOLOGY & ADDITIONAL TESTS:  EXAM:  MR SPINE LUMBAR                        PROCEDURE DATE:  06/17/2021      INTERPRETATION:  CLINICAL INFORMATION: severe lumbar spine radiculopathy. severe lumbar spine radiculopathy, L5/S1 spondy. Admitting Dxs: M79.605 PAIN IN LEFT LEG.    TECHNIQUE: Multiplanar multisequence MR of the lumbar spine was performed without intravenous contrast.    COMPARISON: CT abdomen and pelvis 6/16/2021.    FINDINGS:    Grade 1 anterolisthesis at L5-S1. Lumbar vertebral bodies are normal in height. Nonspecific T2/STIR hyperintense lesions in the T10, T12, L3 and L4 vertebral bodies. Multilevel intervertebral disc desiccation.    Evaluation of individual levels demonstrates:    L1-L2: No significant spinal canal or neuroforaminal narrowing.    L2-L3: No significant spinal canal or neuroforaminal narrowing.    L3-L4: No significant spinal canal or neuroforaminal narrowing.    L4-L5: Disc bulge with annular fissure. Bilateral facet arthrosis. No significant spinal canal narrowing. Mild left neuroforaminal narrowing.    L5-S1: Uncovering of the L5-S1 disc. Bilateral facet arthrosis. Indentation of the ventral thecal sac. Severe right and mild left neuroforaminal narrowing.    The conus is normal in position and morphology at the L1 level.      IMPRESSION:  1.  Degenerative changes most notable at L4-L5 and L5-S1.  2.  Nonspecific T2/STIR hyperintense lesions in the T10, T12, L3 and L4 vertebral bodies. These could represent atypical hemangiomas. If there is clinical suspicion for malignancy, bone scan can be obtained.    EXAM:  NM BONE SPECT CT SA SD                        EXAM:  NM BONE IMG WHOLE BODY                        PROCEDURE DATE:  06/18/2021    INTERPRETATION:  CLINICAL INFORMATION: 70 year-old female with lower back and left leg pain, abnormalities on MRI, referred for further evaluation,    RADIOPHARMACEUTICAL: 21.9 mCi Tc-99m HDP, I.V.    TECHNIQUE:  Whole-body planar images were obtained in the anterior and posterior projections approximately 2-3 hours after tracer injection.  SPECT/CT of the thoracic and lumbosacral spine thoracolumbar spine was also performed. The CT protocol was optimized for SPECT attenuation correction and to provide anatomic detail for localization of SPECT abnormalities. The CT protocol was not designed to produce and cannot replace state-of- the-art diagnostic CT images with specific imaging protocols for different body parts and indications.    COMPARISON: No previous bone scan for comparison    FINDINGS: There are degenerative changes in the spine and major joints. There is physiologic tracer distribution in the remainder of the visualized skeleton. No foci of abnormally increased or decreased tracer accumulation are identified to suggest the presence of osseous metastasis.    Both kidneys are visualized.    IMPRESSION: Bone scan demonstrates:    No scan evidence of osseous metastasis.    Degenerative disease in the spine and major joints.    PHYSICAL EXAM:  GENERAL: NAD, well-groomed, well-developed  HEAD:  Atraumatic, Normocephalic  EYES: EOMI, PERRLA, conjunctiva and sclera clear  HEENT: Moist mucous membranes  NECK: Supple, No JVD  NERVOUS SYSTEM:  Alert & Oriented X3, Motor Strength 5/5 B/L upper and lower extremities; DTRs 2+ intact and symmetric  CHEST/LUNG: Clear to auscultation bilaterally; No rales, rhonchi, wheezing, or rubs  HEART: Regular rate and rhythm; No murmurs, rubs, or gallops  ABDOMEN: Soft, Nontender, Nondistended; Bowel sounds present  GENITOURINARY- Voiding, no palpable bladder  EXTREMITIES:  2+ Peripheral Pulses, No clubbing, cyanosis, or edema  MUSCULOSKELTAL- No muscle tenderness, Muscle tone normal, No joint tenderness, no Joint swelling, Joint range of motion-normal  SKIN-no rash, no lesion  CNS- alert, oriented X3, non focal     MEDICATIONS  (STANDING):  ascorbic acid 500 milliGRAM(s) Oral two times a day  DULoxetine 80 milliGRAM(s) Oral daily  famotidine    Tablet 20 milliGRAM(s) Oral every 12 hours  folic acid 1 milliGRAM(s) Oral daily  lactated ringers. 1000 milliLiter(s) (75 mL/Hr) IV Continuous <Continuous>  levothyroxine 100 MICROGram(s) Oral daily  multivitamin 1 Tablet(s) Oral daily    MEDICATIONS  (PRN):  HYDROmorphone  Injectable 1 milliGRAM(s) SubCutaneous every 6 hours PRN Severe Pain (7 - 10)  oxyCODONE    IR 10 milliGRAM(s) Oral every 4 hours PRN Moderate Pain (4 - 6)  oxyCODONE    IR 5 milliGRAM(s) Oral every 4 hours PRN Mild Pain (1 - 3)  zolpidem 5 milliGRAM(s) Oral at bedtime PRN Insomnia    Assessment/Plan  #LT-sided sciatica  Spine eval appreciated  Imaging studies reviewed  Bone scan no evidence of metastasis  Pain meds prn  OOb to ambulate  Further plan per spine    #Hypothyroidism  Cont synthroid    #Depression- cont cymbalta    #DVT proph- venodynes, ambulate    #Dispo- thank you for consult, will follow. D/w pt

## 2021-06-18 NOTE — PROGRESS NOTE ADULT - SUBJECTIVE AND OBJECTIVE BOX
pain still terrible to left ant thigh  afeb vss  nv intact  + fem stretch left      mri not helpful....? foraminal stenosis  ? disc HNP    bone scan neg

## 2021-06-19 PROCEDURE — 99232 SBSQ HOSP IP/OBS MODERATE 35: CPT

## 2021-06-19 RX ORDER — DEXAMETHASONE 0.5 MG/5ML
2 ELIXIR ORAL EVERY 12 HOURS
Refills: 0 | Status: DISCONTINUED | OUTPATIENT
Start: 2021-06-22 | End: 2021-06-21

## 2021-06-19 RX ORDER — DEXAMETHASONE 0.5 MG/5ML
ELIXIR ORAL
Refills: 0 | Status: CANCELLED | OUTPATIENT
Start: 2021-06-20 | End: 2021-06-21

## 2021-06-19 RX ORDER — DEXAMETHASONE 0.5 MG/5ML
6 ELIXIR ORAL EVERY 8 HOURS
Refills: 0 | Status: COMPLETED | OUTPATIENT
Start: 2021-06-20 | End: 2021-06-20

## 2021-06-19 RX ORDER — DEXAMETHASONE 0.5 MG/5ML
4 ELIXIR ORAL EVERY 8 HOURS
Refills: 0 | Status: DISCONTINUED | OUTPATIENT
Start: 2021-06-21 | End: 2021-06-21

## 2021-06-19 RX ADMIN — FAMOTIDINE 20 MILLIGRAM(S): 10 INJECTION INTRAVENOUS at 13:40

## 2021-06-19 RX ADMIN — Medication 101.6 MILLIGRAM(S): at 13:40

## 2021-06-19 RX ADMIN — Medication 75 MILLIGRAM(S): at 17:29

## 2021-06-19 RX ADMIN — Medication 1 TABLET(S): at 13:29

## 2021-06-19 RX ADMIN — Medication 100 MICROGRAM(S): at 05:57

## 2021-06-19 RX ADMIN — Medication 500 MILLIGRAM(S): at 13:29

## 2021-06-19 RX ADMIN — Medication 1 MILLIGRAM(S): at 13:29

## 2021-06-19 RX ADMIN — DULOXETINE HYDROCHLORIDE 80 MILLIGRAM(S): 30 CAPSULE, DELAYED RELEASE ORAL at 13:29

## 2021-06-19 RX ADMIN — Medication 75 MILLIGRAM(S): at 05:57

## 2021-06-19 RX ADMIN — Medication 101.6 MILLIGRAM(S): at 05:57

## 2021-06-19 NOTE — PROGRESS NOTE ADULT - SUBJECTIVE AND OBJECTIVE BOX
CC- lumbar radiculopathy    HPI:  71yo/F with PMH depression, hypothyroidism, hyperlipidemia, right rotator cuff injury, presented for evaluation of Lt sciatica. Patient had symptoms at the left thigh and was seen by DR Rao in the office. Admitted here for further work up. Medical consult called for medical management    6/19: pt seen and examined this am. Feeling better with steroids. Pain improving. States it started while she was sitting at a table. No heavy lifting. extends from buttocks down left thigh up to knee. No difficulty voiding. no parasthesis.       Review of system- All 10 systems reviewed and is as per HPI otherwise negative.     Vital Signs Last 24 Hrs  T(C): 36.4 (19 Jun 2021 08:45), Max: 36.8 (18 Jun 2021 19:42)  T(F): 97.5 (19 Jun 2021 08:45), Max: 98.3 (18 Jun 2021 19:42)  HR: 72 (19 Jun 2021 08:45) (70 - 78)  BP: 105/57 (19 Jun 2021 08:45) (105/57 - 131/62)  RR: 18 (19 Jun 2021 08:45) (18 - 18)  SpO2: 97% (19 Jun 2021 08:45) (96% - 98%)      PHYSICAL EXAM:    GENERAL: Comfortable, no acute distress   HEAD:  Normocephalic, atraumatic  EYES: EOMI, PERRLA  HEENT: Moist mucous membranes  NECK: Supple, No JVD  NERVOUS SYSTEM:  Alert & Oriented X3, non focal.   CHEST/LUNG: Clear to auscultation bilaterally  HEART: Regular rate and rhythm  ABDOMEN: Soft, Nontender, Nondistended, Bowel sounds present  GENITOURINARY: Voiding, no palpable bladder  EXTREMITIES:   No clubbing, cyanosis, or edema  MUSCULOSKELETAL- +SLR RLE  SKIN-no rash    LABS: none today.    MR Lumbar Spine No Cont (06.17.21 @ 13:36) >  IMPRESSION:  1.  Degenerative changes most notable at L4-L5 and L5-S1.  2.  Nonspecific T2/STIR hyperintense lesions in the T10, T12, L3 and L4 vertebral bodies. These could represent atypical hemangiomas. If there is clinical suspicion for malignancy, bone scan can be obtained.      EXAM:  NM BONE SPECT CT SA SD                        EXAM:  NM BONE IMG WHOLE BODY                        PROCEDURE DATE:  06/18/2021    INTERPRETATION:  CLINICAL INFORMATION: 70 year-old female with lower back and left leg pain, abnormalities on MRI, referred for further evaluation,    IMPRESSION: Bone scan demonstrates:  No scan evidence of osseous metastasis.  Degenerative disease in the spine and major joints.    MEDICATIONS  (STANDING):  ascorbic acid 500 milliGRAM(s) Oral two times a day  dexAMETHasone  IVPB 8 milliGRAM(s) IV Intermittent every 8 hours  dexAMETHasone  IVPB   IV Intermittent   DULoxetine 80 milliGRAM(s) Oral daily  famotidine    Tablet 20 milliGRAM(s) Oral every 12 hours  folic acid 1 milliGRAM(s) Oral daily  lactated ringers. 1000 milliLiter(s) (75 mL/Hr) IV Continuous <Continuous>  levothyroxine 100 MICROGram(s) Oral daily  multivitamin 1 Tablet(s) Oral daily  pregabalin 75 milliGRAM(s) Oral every 12 hours    MEDICATIONS  (PRN):  HYDROmorphone  Injectable 1 milliGRAM(s) SubCutaneous every 6 hours PRN Severe Pain (7 - 10)  oxyCODONE    IR 10 milliGRAM(s) Oral every 4 hours PRN Moderate Pain (4 - 6)  oxyCODONE    IR 5 milliGRAM(s) Oral every 4 hours PRN Mild Pain (1 - 3)  zolpidem 5 milliGRAM(s) Oral at bedtime PRN Insomnia      Assessment/Plan  #LT-sided sciatica  Spine eval appreciated  Imaging studies reviewed  Bone scan no evidence of metastasis  MRI unrevealing  on decadron iv  symptoms improving.   ambulate   pain control  physical therapy    #Hypothyroidism  -Cont synthroid    #Depression  - cont cymbalta    #DVT proph  - venodynes, ambulate

## 2021-06-20 DIAGNOSIS — M54.16 RADICULOPATHY, LUMBAR REGION: ICD-10-CM

## 2021-06-20 PROCEDURE — 99232 SBSQ HOSP IP/OBS MODERATE 35: CPT

## 2021-06-20 RX ADMIN — Medication 6 MILLIGRAM(S): at 13:49

## 2021-06-20 RX ADMIN — Medication 1 TABLET(S): at 10:57

## 2021-06-20 RX ADMIN — Medication 500 MILLIGRAM(S): at 21:45

## 2021-06-20 RX ADMIN — Medication 500 MILLIGRAM(S): at 10:56

## 2021-06-20 RX ADMIN — FAMOTIDINE 20 MILLIGRAM(S): 10 INJECTION INTRAVENOUS at 10:56

## 2021-06-20 RX ADMIN — Medication 6 MILLIGRAM(S): at 21:46

## 2021-06-20 RX ADMIN — Medication 1 MILLIGRAM(S): at 10:56

## 2021-06-20 RX ADMIN — Medication 75 MILLIGRAM(S): at 18:35

## 2021-06-20 RX ADMIN — FAMOTIDINE 20 MILLIGRAM(S): 10 INJECTION INTRAVENOUS at 21:45

## 2021-06-20 RX ADMIN — DULOXETINE HYDROCHLORIDE 80 MILLIGRAM(S): 30 CAPSULE, DELAYED RELEASE ORAL at 10:56

## 2021-06-20 RX ADMIN — Medication 6 MILLIGRAM(S): at 05:43

## 2021-06-20 RX ADMIN — Medication 100 MICROGRAM(S): at 05:43

## 2021-06-20 RX ADMIN — Medication 75 MILLIGRAM(S): at 05:43

## 2021-06-20 NOTE — PROGRESS NOTE ADULT - SUBJECTIVE AND OBJECTIVE BOX
more comfortable today  less left thigh pain  nv intact  Still + fem stretch left    responding well to steroids  on a taper right now

## 2021-06-20 NOTE — PROGRESS NOTE ADULT - SUBJECTIVE AND OBJECTIVE BOX
CC- lumbar radiculopathy    HPI:  71yo/F with PMH depression, hypothyroidism, hyperlipidemia, right rotator cuff injury, presented for evaluation of Lt sciatica. Patient had symptoms at the left thigh and was seen by DR Rao in the office. Admitted here for further work up. Medical consult called for medical management    6/20: pt seen and examined this am. Feeling much better. ambulating without difficulty. no acute overnight events     Review of system- All 10 systems reviewed and is as per HPI otherwise negative.     Vital Signs Last 24 Hrs  T(C): 36.5 (20 Jun 2021 09:08), Max: 36.9 (19 Jun 2021 19:34)  T(F): 97.7 (20 Jun 2021 09:08), Max: 98.5 (19 Jun 2021 19:34)  HR: 86 (20 Jun 2021 09:08) (66 - 86)  BP: 106/53 (20 Jun 2021 09:08) (106/53 - 111/56)  RR: 18 (20 Jun 2021 09:08) (18 - 18)  SpO2: 95% (20 Jun 2021 09:08) (95% - 98%)    PHYSICAL EXAM:    GENERAL: Comfortable, no acute distress   HEAD:  Normocephalic, atraumatic  EYES: EOMI, PERRLA  HEENT: Moist mucous membranes  NECK: Supple, No JVD  NERVOUS SYSTEM:  Alert & Oriented X3, non focal.   CHEST/LUNG: Clear to auscultation bilaterally  HEART: Regular rate and rhythm  ABDOMEN: Soft, Nontender, Nondistended, Bowel sounds present  GENITOURINARY: Voiding, no palpable bladder  EXTREMITIES:   No clubbing, cyanosis, or edema  MUSCULOSKELETAL- +SLR RLE  SKIN-no rash    LABS: none today.    MR Lumbar Spine No Cont (06.17.21 @ 13:36) >  IMPRESSION:  1.  Degenerative changes most notable at L4-L5 and L5-S1.  2.  Nonspecific T2/STIR hyperintense lesions in the T10, T12, L3 and L4 vertebral bodies. These could represent atypical hemangiomas. If there is clinical suspicion for malignancy, bone scan can be obtained.      EXAM:  NM BONE SPECT CT SA SD                        EXAM:  NM BONE IMG WHOLE BODY                        PROCEDURE DATE:  06/18/2021    INTERPRETATION:  CLINICAL INFORMATION: 70 year-old female with lower back and left leg pain, abnormalities on MRI, referred for further evaluation,    IMPRESSION: Bone scan demonstrates:  No scan evidence of osseous metastasis.  Degenerative disease in the spine and major joints.    MEDICATIONS  (STANDING):  ascorbic acid 500 milliGRAM(s) Oral two times a day  dexAMETHasone  IVPB 8 milliGRAM(s) IV Intermittent every 8 hours  dexAMETHasone  IVPB   IV Intermittent   DULoxetine 80 milliGRAM(s) Oral daily  famotidine    Tablet 20 milliGRAM(s) Oral every 12 hours  folic acid 1 milliGRAM(s) Oral daily  lactated ringers. 1000 milliLiter(s) (75 mL/Hr) IV Continuous <Continuous>  levothyroxine 100 MICROGram(s) Oral daily  multivitamin 1 Tablet(s) Oral daily  pregabalin 75 milliGRAM(s) Oral every 12 hours    MEDICATIONS  (PRN):  HYDROmorphone  Injectable 1 milliGRAM(s) SubCutaneous every 6 hours PRN Severe Pain (7 - 10)  oxyCODONE    IR 10 milliGRAM(s) Oral every 4 hours PRN Moderate Pain (4 - 6)  oxyCODONE    IR 5 milliGRAM(s) Oral every 4 hours PRN Mild Pain (1 - 3)  zolpidem 5 milliGRAM(s) Oral at bedtime PRN Insomnia      Assessment/Plan  #LT-sided sciatica  Spine eval appreciated  Imaging studies reviewed  Bone scan no evidence of metastasis  MRI unrevealing  on decadron iv taper  symptoms improving.   ambulate   pain control  physical therapy    #Hypothyroidism  -Cont synthroid    #Depression  - cont cymbalta    #DVT proph  - venodynes, ambulate

## 2021-06-21 ENCOUNTER — TRANSCRIPTION ENCOUNTER (OUTPATIENT)
Age: 71
End: 2021-06-21

## 2021-06-21 VITALS
OXYGEN SATURATION: 98 % | TEMPERATURE: 98 F | SYSTOLIC BLOOD PRESSURE: 119 MMHG | HEART RATE: 77 BPM | DIASTOLIC BLOOD PRESSURE: 69 MMHG | RESPIRATION RATE: 16 BRPM

## 2021-06-21 DIAGNOSIS — M79.605 PAIN IN LEFT LEG: ICD-10-CM

## 2021-06-21 PROCEDURE — 99232 SBSQ HOSP IP/OBS MODERATE 35: CPT

## 2021-06-21 RX ADMIN — Medication 4 MILLIGRAM(S): at 06:18

## 2021-06-21 RX ADMIN — DULOXETINE HYDROCHLORIDE 80 MILLIGRAM(S): 30 CAPSULE, DELAYED RELEASE ORAL at 08:55

## 2021-06-21 RX ADMIN — Medication 1 MILLIGRAM(S): at 08:54

## 2021-06-21 RX ADMIN — Medication 500 MILLIGRAM(S): at 08:54

## 2021-06-21 RX ADMIN — Medication 75 MILLIGRAM(S): at 06:17

## 2021-06-21 RX ADMIN — Medication 100 MICROGRAM(S): at 06:17

## 2021-06-21 RX ADMIN — Medication 1 TABLET(S): at 08:54

## 2021-06-21 RX ADMIN — FAMOTIDINE 20 MILLIGRAM(S): 10 INJECTION INTRAVENOUS at 08:54

## 2021-06-21 NOTE — DISCHARGE NOTE PROVIDER - NSDCCPCAREPLAN_GEN_ALL_CORE_FT
PRINCIPAL DISCHARGE DIAGNOSIS  Diagnosis: Leg pain, left  Assessment and Plan of Treatment: left sciatica      SECONDARY DISCHARGE DIAGNOSES  Diagnosis: Lumbar radiculopathy  Assessment and Plan of Treatment:

## 2021-06-21 NOTE — DISCHARGE NOTE PROVIDER - HOSPITAL COURSE
admit due to intractable pain  workup  commence steroids and lyrica  did well  dc on medrol taper at home

## 2021-06-21 NOTE — PROGRESS NOTE ADULT - SUBJECTIVE AND OBJECTIVE BOX
CC- lumbar radiculopathy    HPI:  69yo/F with PMH depression, hypothyroidism, hyperlipidemia, right rotator cuff injury, presented for evaluation of Lt sciatica. Patient had symptoms at the left thigh and was seen by DR Rao in the office. Admitted here for further work up. Medical consult called for medical management    6/21: pt seen and examined this am at bedside.. Feeling much better. Ambulating without difficulty. no acute overnight events     Review of system- All 10 systems reviewed and is as per HPI otherwise negative.       Vital Signs Last 24 Hrs  T(C): 36.7 (06-21-21 @ 08:46), Max: 36.7 (06-21-21 @ 08:46)  T(F): 98 (06-21-21 @ 08:46), Max: 98 (06-21-21 @ 08:46)  HR: 77 (06-21-21 @ 08:46) (68 - 81)  BP: 119/69 (06-21-21 @ 08:46) (119/60 - 121/65)  BP(mean): 79 (06-21-21 @ 08:46) (79 - 79)  RR: 16 (06-21-21 @ 08:46) (16 - 18)  SpO2: 98% (06-21-21 @ 08:46) (98% - 99%)    PHYSICAL EXAM:    GENERAL: Comfortable, no acute distress   HEAD:  Normocephalic, atraumatic  EYES: EOMI, PERRLA  HEENT: Moist mucous membranes  NECK: Supple, No JVD  NERVOUS SYSTEM:  Alert & Oriented X3, non focal.   CHEST/LUNG: Clear to auscultation bilaterally  HEART: Regular rate and rhythm  ABDOMEN: Soft, Nontender, Nondistended, Bowel sounds present  GENITOURINARY: Voiding, no palpable bladder  EXTREMITIES:   No clubbing, cyanosis, or edema  MUSCULOSKELETAL- no muscle tenderness, no joint tenderness  SKIN-no rash    LABS: none today.    MR Lumbar Spine No Cont (06.17.21 @ 13:36) >  IMPRESSION:  1.  Degenerative changes most notable at L4-L5 and L5-S1.  2.  Nonspecific T2/STIR hyperintense lesions in the T10, T12, L3 and L4 vertebral bodies. These could represent atypical hemangiomas. If there is clinical suspicion for malignancy, bone scan can be obtained.      EXAM:  NM BONE SPECT CT SA SD                        EXAM:  NM BONE IMG WHOLE BODY                        PROCEDURE DATE:  06/18/2021    INTERPRETATION:  CLINICAL INFORMATION: 70 year-old female with lower back and left leg pain, abnormalities on MRI, referred for further evaluation,    IMPRESSION: Bone scan demonstrates:  No scan evidence of osseous metastasis.  Degenerative disease in the spine and major joints.      MEDICATIONS  (STANDING):  ascorbic acid 500 milliGRAM(s) Oral two times a day  dexAMETHasone  Injectable 4 milliGRAM(s) IV Push every 8 hours  DULoxetine 80 milliGRAM(s) Oral daily  famotidine    Tablet 20 milliGRAM(s) Oral every 12 hours  folic acid 1 milliGRAM(s) Oral daily  lactated ringers. 1000 milliLiter(s) (75 mL/Hr) IV Continuous <Continuous>  levothyroxine 100 MICROGram(s) Oral daily  multivitamin 1 Tablet(s) Oral daily  pregabalin 75 milliGRAM(s) Oral every 12 hours    MEDICATIONS  (PRN):  HYDROmorphone  Injectable 1 milliGRAM(s) SubCutaneous every 6 hours PRN Severe Pain (7 - 10)  oxyCODONE    IR 10 milliGRAM(s) Oral every 4 hours PRN Moderate Pain (4 - 6)  oxyCODONE    IR 5 milliGRAM(s) Oral every 4 hours PRN Mild Pain (1 - 3)  zolpidem 5 milliGRAM(s) Oral at bedtime PRN Insomnia      Assessment/Plan  #LT-sided sciatica  Spine eval appreciated  Imaging studies reviewed  Bone scan no evidence of metastasis  MRI unrevealing  on decadron iv taper  lyrica  symptoms improving.   ambulate   pain control  physical therapy    #Hypothyroidism  -Cont synthroid    #Depression  - cont cymbalta    #DVT proph  - venodynes, ambulate    dispo: home when ok with spine     CC- lumbar radiculopathy    HPI:  69yo/F with PMH depression, hypothyroidism, hyperlipidemia, right rotator cuff injury, presented for evaluation of Lt sciatica. Patient had symptoms at the left thigh and was seen by DR Rao in the office. Admitted here for further work up. Medical consult called for medical management    6/21: pt seen and examined this am at bedside.. Feeling much better. Ambulating without difficulty. no acute overnight events       Review of system- All 10 systems reviewed and is as per HPI otherwise negative.       Vital Signs Last 24 Hrs  T(C): 36.7 (06-21-21 @ 08:46), Max: 36.7 (06-21-21 @ 08:46)  T(F): 98 (06-21-21 @ 08:46), Max: 98 (06-21-21 @ 08:46)  HR: 77 (06-21-21 @ 08:46) (68 - 81)  BP: 119/69 (06-21-21 @ 08:46) (119/60 - 121/65)  BP(mean): 79 (06-21-21 @ 08:46) (79 - 79)  RR: 16 (06-21-21 @ 08:46) (16 - 18)  SpO2: 98% (06-21-21 @ 08:46) (98% - 99%)    PHYSICAL EXAM:    GENERAL: Comfortable, no acute distress   HEAD:  Normocephalic, atraumatic  EYES: EOMI, PERRLA  HEENT: Moist mucous membranes  NECK: Supple, No JVD  NERVOUS SYSTEM:  Alert & Oriented X3, non focal.   CHEST/LUNG: Clear to auscultation bilaterally  HEART: Regular rate and rhythm  ABDOMEN: Soft, Nontender, Nondistended, Bowel sounds present  GENITOURINARY: Voiding, no palpable bladder  EXTREMITIES:   No clubbing, cyanosis, or edema  MUSCULOSKELETAL- no muscle tenderness, no joint tenderness  SKIN-no rash    LABS: none today.    MR Lumbar Spine No Cont (06.17.21 @ 13:36) >  IMPRESSION:  1.  Degenerative changes most notable at L4-L5 and L5-S1.  2.  Nonspecific T2/STIR hyperintense lesions in the T10, T12, L3 and L4 vertebral bodies. These could represent atypical hemangiomas. If there is clinical suspicion for malignancy, bone scan can be obtained.      EXAM:  NM BONE SPECT CT SA SD                        EXAM:  NM BONE IMG WHOLE BODY                        PROCEDURE DATE:  06/18/2021    INTERPRETATION:  CLINICAL INFORMATION: 70 year-old female with lower back and left leg pain, abnormalities on MRI, referred for further evaluation,    IMPRESSION: Bone scan demonstrates:  No scan evidence of osseous metastasis.  Degenerative disease in the spine and major joints.      MEDICATIONS  (STANDING):  ascorbic acid 500 milliGRAM(s) Oral two times a day  dexAMETHasone  Injectable 4 milliGRAM(s) IV Push every 8 hours  DULoxetine 80 milliGRAM(s) Oral daily  famotidine    Tablet 20 milliGRAM(s) Oral every 12 hours  folic acid 1 milliGRAM(s) Oral daily  lactated ringers. 1000 milliLiter(s) (75 mL/Hr) IV Continuous <Continuous>  levothyroxine 100 MICROGram(s) Oral daily  multivitamin 1 Tablet(s) Oral daily  pregabalin 75 milliGRAM(s) Oral every 12 hours    MEDICATIONS  (PRN):  HYDROmorphone  Injectable 1 milliGRAM(s) SubCutaneous every 6 hours PRN Severe Pain (7 - 10)  oxyCODONE    IR 10 milliGRAM(s) Oral every 4 hours PRN Moderate Pain (4 - 6)  oxyCODONE    IR 5 milliGRAM(s) Oral every 4 hours PRN Mild Pain (1 - 3)  zolpidem 5 milliGRAM(s) Oral at bedtime PRN Insomnia      Assessment/Plan  #LT-sided sciatica  Spine eval appreciated  Imaging studies reviewed  Bone scan no evidence of metastasis  MRI unrevealing  on decadron iv taper  lyrica  symptoms improving.   ambulate   pain control  physical therapy    #Hypothyroidism  -Cont synthroid    #Depression  - cont cymbalta    #DVT proph  - venodynes, ambulate    dispo: home today per spine.

## 2021-06-21 NOTE — PROGRESS NOTE ADULT - REASON FOR ADMISSION
LSp radiculopathy

## 2021-06-21 NOTE — DISCHARGE NOTE PROVIDER - NSDCFUSCHEDAPPT_GEN_ALL_CORE_FT
LEORA ALVARENGA ; 06/24/2021 ; NPP OrthoSurg [facility]  LEORA ALVARENGA ; 07/01/2021 ; NPP Neurology [address]

## 2021-06-21 NOTE — PROGRESS NOTE ADULT - ASSESSMENT
Hospitalist Progress Note


Assessment/Plan: 





# acute encephalopathy d/t infection


   - resolved


# MRSA bacteremia - possible cutaneous source


   - cont vanc, stop date 1/3/18


# c. dif colitis - cont vanc PO, then suppressive while on vanc IV


# cirrhosis d/t HCV, etOH


   - cont lactulose, lasix, aldactone, propranolol (decrease dose today)


   - consider HCV treatment as outpatient


   - needs hepatology f/u


# chronic pain with continuous narcotic dependency


# anemia of chronic inflammation


Subjective: no acute events; eating well, no complaints


Objective: 


 Vital Signs











Temp Pulse Resp BP Pulse Ox


 


 36.6 C   73   16   101/76   96 


 


 12/12/17 08:00  12/12/17 08:00  12/12/17 08:00  12/12/17 08:46  12/12/17 08:00








 Microbiology











 12/07/17 05:00 Blood Culture - Final





 Blood 


 


 12/07/17 05:42 Blood Culture - Final





 Blood 


 


 12/06/17 12:00 Blood Culture - Final





 Blood 


 


 12/06/17 12:03 Blood Culture - Final





 Blood 








 Laboratory Results





 12/12/17 05:02 





 12/12/17 05:02 





 











 12/11/17 12/12/17 12/13/17





 05:59 05:59 05:59


 


Intake Total 1710 1900 


 


Output Total  100 


 


Balance 1710 1800 








 











PT  16.4 SEC (12.0-15.0)  H  12/04/17  12:20    


 


INR  1.30  (0.83-1.16)  H  12/04/17  12:20    














- Physical Exam


Constitutional: no apparent distress


Eyes: anicteric sclera


Ears, Nose, Mouth, Throat: hearing normal


Respiratory: no respiratory distress


Gastrointestinal: No distension


Genitourinary: No addison in urethra


Skin: normal color


Musculoskeletal: full muscle strength


Neurologic: AAOx3


Psychiatric: interacting appropriately





ICD10 Worksheet


Patient Problems: 


 Problems











Problem Status Onset


 


Dyspnea Acute  


 


Liver cirrhosis Acute  


 


Hepatorenal failure Acute  


 


Hyperammonemia Acute  


 


Palliative care encounter Acute  


 


Altered mental status Acute  


 


C. difficile diarrhea Acute  ~05/12/17


 


Alcohol dependence Active  


 


Kidney disease Active  


 


Cellulitis Active  


 


Necrotizing fasciitis Active  


 


Acute renal failure Acute  


 


Squamous cell carcinoma Acute  


 


Alcoholism Chronic  


 


Gait instability Chronic  


 


Squamous cell cancer of skin of right shoulder Acute  


 


Hyperkalemia Acute  


 


Low back pain Chronic  


 


Hepatitis C Chronic  


 


Edema extremities Acute  


 


HTN (hypertension) Acute  


 


MRSA (methicillin resistant staph aureus) culture positive Acute  07/21/14


 


Pneumonia Acute  


 


Hyperbilirubinemia Acute  


 


Weakness Acute
doing well  less left LS radiculopathy  ambulatory
left femoral radiculopathy  specific etiology unclear  may need additional testing if improvement does not continue
left femoral radiculopathy...terrible pain
intractable left sciatica  Hnp Lumbar left  spondy L5S1
left femoral radiculopathy    improving

## 2021-06-21 NOTE — PROGRESS NOTE ADULT - ATTENDING COMMENTS
Pt seen and examined this am with louann enamorado. agree with documentation as above with changes made where appropriate.   pt admitted for intractable back pain.   doing better with iv steroids, lyrica.   ok for dc home from medicine standpoint.

## 2021-06-21 NOTE — DISCHARGE NOTE NURSING/CASE MANAGEMENT/SOCIAL WORK - PATIENT PORTAL LINK FT
You can access the FollowMyHealth Patient Portal offered by [facility] by registering at the following website: [email or web address]. By joining [facility]’s FollowMyHealth portal, you will also be able to view your health information using other applications (apps) compatible with our system.

## 2021-06-21 NOTE — PROGRESS NOTE ADULT - PROBLEM SELECTOR PLAN 1
improving slowly on decadron taper  will follow  if no improvement  will need myelo CT  discussed fully w pt  all questions answered
much improved  ok to go home  '  instructions given to patient  all questions answered
continue IV steroids &  lyrica
pt's symptoms consistent with high lumbar disc HNP or extraforaminal HNP on left    studies do not support that diagnosis at this time'    empirically will start decadron taper and lyrica    may need lumbar myelo CT if no improvement    discussed w pt    all questions answered
MRI pending  pt extremely anxious about study today  combination of anesthesia and closed mri unit and lying flat for mri    reassured pt  all questions answered  will review mri later today

## 2021-06-21 NOTE — DISCHARGE NOTE PROVIDER - NSDCMRMEDTOKEN_GEN_ALL_CORE_FT
Blood pressure is adequate on the current regimen  Will continue to monitor the blood pressure  Internal Medicine is consulted for further blood pressure management  atorvastatin 10 mg oral tablet: 1 tab(s) orally once a day  Calcium: 1 tab(s) orally once a day (in the evening)  Co Q-10: 1 cap(s) orally once a day (in the evening)  DULoxetine 20 mg oral delayed release capsule: 1 cap(s) orally once a day  ***take with 60mg = 80mg***  DULoxetine 60 mg oral delayed release capsule: 1 cap(s) orally once a day  ***take with 20mg = 80mg***  Fish Oil oral capsule: 1 cap(s) orally once a day (in the evening)  levothyroxine 100 mcg (0.1 mg) oral tablet: 1 tab(s) orally once a day  Multiple Vitamins oral tablet: 1 tab(s) orally once a day (in the evening)  pregabalin 75 mg oral capsule: 1 cap(s) orally every 12 hours

## 2021-06-21 NOTE — DISCHARGE NOTE PROVIDER - NSDCACTIVITY_GEN_ALL_CORE
Return to Work/School allowed/Bathing allowed/Do not drive or operate machinery/Showering allowed/Do not make important decisions/Stairs allowed/Driving allowed/Walking - Indoors allowed/No heavy lifting/straining/Walking - Outdoors allowed

## 2021-06-21 NOTE — DISCHARGE NOTE PROVIDER - CARE PROVIDER_API CALL
Miguel Rao)  Orthopaedic Surgery  [address] [place] [address]  Phone: [phone]  Fax: [phone]  Follow Up Time:

## 2021-06-22 ENCOUNTER — TRANSCRIPTION ENCOUNTER (OUTPATIENT)
Age: 71
End: 2021-06-22

## 2021-06-22 LAB
CULTURE RESULTS: SIGNIFICANT CHANGE UP
CULTURE RESULTS: SIGNIFICANT CHANGE UP
SPECIMEN SOURCE: SIGNIFICANT CHANGE UP
SPECIMEN SOURCE: SIGNIFICANT CHANGE UP

## 2021-06-22 NOTE — PHYSICAL EXAM
[de-identified] : Right Hip: \par Range of Motion in Degrees:\par 	                                 Claimant:	   Normal:	\par Flexion (Active) 	                 120 	   120-degrees	\par Flexion (Passive)	                 120	   120-degrees	\par Extension (Active)	                 -30	   -30-degrees	\par Extension (Passive)	 -30	   -30-degrees	\par Abduction (Active)	                 45-50	   21-58-gonuvrs	\par Abduction (Passive)	 45-50	   06-65-tkmzpvx	\par Adduction (Active)  	 20-30	   14-01-yxbnflo	\par Adduction (Passive)	 20-30	   75-97-lalnoor	\par Internal Rotation (Active) 	 35	   35-degrees	\par Internal Rotation (Passive)	 35	   35-degrees	\par External Rotation (Active)	 45	   45-degrees	\par External Rotation (Passive)	 45	   45-degrees	\par \par Mildly positive straight leg raise. No gross neurologic or vascular deficits distally. No tenderness with internal or external rotation or axial load.  No tenderness to palpation over the greater trochanter.  Negative Trendelenburg.  No tenderness with resisted abduction.  No weakness to flexion, extension, abduction or adduction.  No evidence of instability. 2+ DP and PT pulses.  Skin is intact.  No scars, rashes or lesions.  \par  \par Left Hip: \par Range of Motion in Degrees:\par 	                                 Claimant:	   Normal:	\par Flexion (Active) 	                 120 	   120-degrees	\par Flexion (Passive)	                 120	   120-degrees	\par Extension (Active)	                 -30	   -30-degrees	\par Extension (Passive)	 -30	   -30-degrees	\par Abduction (Active)	                 45-50	   50-02-yvelozp	\par Abduction (Passive)	 45-50	   28-21-rnbyjbw	\par Adduction (Active)  	 20-30	   05-55-qjifiyx	\par Adduction (Passive)	 20-30	   47-26-nwwvdfa	\par Internal Rotation (Active) 	 35	   35-degrees	\par Internal Rotation (Passive)	 35	   35-degrees	\par External Rotation (Active)	 45	   45-degrees	\par External Rotation (Passive)	 45	   45-degrees	\par \par Mildly positive straight leg raise. No gross neurologic or vascular deficits distally. No tenderness with internal or external rotation or axial load.  No tenderness to palpation over the greater trochanter.  Negative Trendelenburg.  No tenderness with resisted abduction.  No weakness to flexion, extension, abduction or adduction.  No evidence of instability. 2+ DP and PT pulses.  Skin is intact.  No scars, rashes or lesions.  \par   [de-identified] : Ambulating with a slightly antalgic to antalgic gait.  Station:  Normal.  [de-identified] : Appearance:  Well-developed, well-nourished female in no acute distress.\par

## 2021-06-22 NOTE — DISCUSSION/SUMMARY
[de-identified] : At this time, due to lumbar radiculopathy, I recommend she be referred for a spine evaluation.

## 2021-06-22 NOTE — ADDENDUM
[FreeTextEntry1] : This note was written by Lala Alexandre on 06/17/2021 acting as a scribe for LIZ RAMOS III, MD

## 2021-06-22 NOTE — HISTORY OF PRESENT ILLNESS
[de-identified] : The patient comes in today with complaints of pain radiating down her legs.  She points to her lower back as the source of her pain.  She went to the emergency room and was referred here for evaluation.

## 2021-06-22 NOTE — CONSULT LETTER
Patient called in stating that Dr. Rio Vasquez at [facility] will be preforming his melanoma procedure. He currently does not have a date, but the office is asking for surgical clearance. Looking to try and have it done early next week.      Patient [Dear  ___] : Dear  [unfilled], [FreeTextEntry1] : \par I am referring Katie Samaniego to you for further evaluation.\par \par Thank you very much for seeing this patient. My most recent\par evaluation follows.\par \par If you have any questions, please do not hesitate to contact me.\par \par Sincerely,\par \par \par \par Cuco Cerrato III, MD\par [facility]/sg

## 2021-06-24 ENCOUNTER — APPOINTMENT (OUTPATIENT)
Dept: ORTHOPEDIC SURGERY | Facility: CLINIC | Age: 71
End: 2021-06-24
Payer: MEDICARE

## 2021-06-24 DIAGNOSIS — M86.9 OSTEOMYELITIS, UNSPECIFIED: ICD-10-CM

## 2021-06-24 PROCEDURE — 99441: CPT | Mod: 95

## 2021-06-25 DIAGNOSIS — E03.9 HYPOTHYROIDISM, UNSPECIFIED: ICD-10-CM

## 2021-06-25 DIAGNOSIS — M54.17 RADICULOPATHY, LUMBOSACRAL REGION: ICD-10-CM

## 2021-06-25 DIAGNOSIS — F40.240 CLAUSTROPHOBIA: ICD-10-CM

## 2021-06-25 DIAGNOSIS — F32.9 MAJOR DEPRESSIVE DISORDER, SINGLE EPISODE, UNSPECIFIED: ICD-10-CM

## 2021-06-25 DIAGNOSIS — E78.5 HYPERLIPIDEMIA, UNSPECIFIED: ICD-10-CM

## 2021-06-25 DIAGNOSIS — M43.17 SPONDYLOLISTHESIS, LUMBOSACRAL REGION: ICD-10-CM

## 2021-06-25 DIAGNOSIS — Z88.2 ALLERGY STATUS TO SULFONAMIDES: ICD-10-CM

## 2021-06-25 DIAGNOSIS — M54.16 RADICULOPATHY, LUMBAR REGION: ICD-10-CM

## 2021-08-26 ENCOUNTER — APPOINTMENT (OUTPATIENT)
Dept: NEUROLOGY | Facility: CLINIC | Age: 71
End: 2021-08-26

## 2021-10-29 PROBLEM — E03.9 HYPOTHYROIDISM, UNSPECIFIED: Chronic | Status: ACTIVE | Noted: 2021-06-17

## 2021-12-20 ENCOUNTER — APPOINTMENT (OUTPATIENT)
Dept: OBGYN | Facility: CLINIC | Age: 71
End: 2021-12-20
Payer: MEDICARE

## 2021-12-20 VITALS
WEIGHT: 111 LBS | DIASTOLIC BLOOD PRESSURE: 64 MMHG | BODY MASS INDEX: 20.43 KG/M2 | SYSTOLIC BLOOD PRESSURE: 110 MMHG | HEIGHT: 62 IN

## 2021-12-20 PROCEDURE — 99212 OFFICE O/P EST SF 10 MIN: CPT | Mod: 25

## 2021-12-20 PROCEDURE — 96372 THER/PROPH/DIAG INJ SC/IM: CPT

## 2022-01-30 ENCOUNTER — RX RENEWAL (OUTPATIENT)
Age: 72
End: 2022-01-30

## 2022-03-18 ENCOUNTER — APPOINTMENT (OUTPATIENT)
Dept: INTERNAL MEDICINE | Facility: CLINIC | Age: 72
End: 2022-03-18
Payer: MEDICARE

## 2022-03-18 VITALS
OXYGEN SATURATION: 99 % | HEIGHT: 62 IN | WEIGHT: 117 LBS | HEART RATE: 66 BPM | SYSTOLIC BLOOD PRESSURE: 110 MMHG | TEMPERATURE: 98 F | RESPIRATION RATE: 16 BRPM | DIASTOLIC BLOOD PRESSURE: 60 MMHG | BODY MASS INDEX: 21.53 KG/M2

## 2022-03-18 DIAGNOSIS — M89.9 DISORDER OF BONE, UNSPECIFIED: ICD-10-CM

## 2022-03-18 DIAGNOSIS — R41.0 DISORIENTATION, UNSPECIFIED: ICD-10-CM

## 2022-03-18 DIAGNOSIS — G44.229 CHRONIC TENSION-TYPE HEADACHE, NOT INTRACTABLE: ICD-10-CM

## 2022-03-18 DIAGNOSIS — R42 DIZZINESS AND GIDDINESS: ICD-10-CM

## 2022-03-18 DIAGNOSIS — R51.9 HEADACHE, UNSPECIFIED: ICD-10-CM

## 2022-03-18 PROCEDURE — G0439: CPT

## 2022-03-18 RX ORDER — TOPIRAMATE 25 MG/1
25 TABLET, FILM COATED ORAL
Qty: 60 | Refills: 2 | Status: DISCONTINUED | COMMUNITY
Start: 2021-05-03 | End: 2022-03-18

## 2022-03-18 RX ORDER — FLUTICASONE PROPIONATE 50 UG/1
50 SPRAY, METERED NASAL
Qty: 16 | Refills: 0 | Status: DISCONTINUED | COMMUNITY
Start: 2021-01-22 | End: 2022-03-18

## 2022-03-18 RX ORDER — PREDNISONE 10 MG/1
10 TABLET ORAL
Qty: 26 | Refills: 0 | Status: DISCONTINUED | COMMUNITY
Start: 2021-01-22 | End: 2022-03-18

## 2022-03-22 NOTE — HEALTH RISK ASSESSMENT
[Never] : Never [No] : In the past 12 months have you used drugs other than those required for medical reasons? No [0] : 2) Feeling down, depressed, or hopeless: Not at all (0) [Patient reported mammogram was normal] : Patient reported mammogram was normal [Patient reported PAP Smear was normal] : Patient reported PAP Smear was normal [Very Good] : ~his/her~  mood as very good [MammogramDate] : 03/21 [PapSmearDate] : 3/21

## 2022-03-22 NOTE — ASSESSMENT
[FreeTextEntry1] : Mrs. Samaniego presents for an annual physical examination.\par \par #1. She will continue on current medication regimen which has been reviewed and revised.\par \par #2. Patient will continue on Qvar inhaler 80 mcg 2 puffs b.i.d. with albuterol for rescue therapy. Followup in 6 months for complete pulmonary function tests.\par \par #3. Patient has been given a refill for scopolamine patches for travel.\par \par #4. Patient will followup with her endocrinologist for management of hypothyroidism\par \par #5. Followup in this office in 6 months.

## 2022-03-28 NOTE — ED ADULT NURSE NOTE - BREATHING, MLM
Refill Authorization Note   Ang Oden  is requesting a refill authorization.  Brief Assessment and Rationale for Refill:  Approve     Medication Therapy Plan:       Medication Reconciliation Completed: No   Comments:   --->Care Gap information included below if applicable.       Requested Prescriptions   Pending Prescriptions Disp Refills    citalopram (CELEXA) 20 MG tablet [Pharmacy Med Name: CITALOPRAM HBR 20 MG TABLET] 90 tablet 1     Sig: TAKE 1 TABLET BY MOUTH EVERY DAY       Psychiatry:  Antidepressants - SSRI Failed - 3/27/2022  7:26 AM        Failed - No ED/Hospital visits since last PCP visit     Last PCP Visit: 9/2/2021 Last Admission: 11/11/2021 Last ED Visit: 2/17/2019          Passed - Patient is at least 18 years old        Passed - Valid encounter within last 15 months     Recent Visits  Date Type Provider Dept   09/02/21 Office Visit Jordana Anderson MD [facility]   Showing recent visits within past 720 days and meeting all other requirements  Future Appointments  No visits were found meeting these conditions.  Showing future appointments within next 150 days and meeting all other requirements      Future Appointments              In 3 weeks Elian Mejia MD [facility] - Cardiology, [facility] at [facility]    In 5 months LAB, [facility] - [facility], [place]    In 5 months MD [facility] - [facility] Medicine, [place]                Passed - Matches previous order       Previous Authorizing Provider: Jordana Anderson MD (citalopram (CELEXA) 20 MG tablet)  Previous Pharmacy: [facility]/pharmacy #[id number] - [place], [place] - [address]                Appointments  past 12m or future 3m with PCP    Date Provider   Last Visit   9/2/2021 Jordana Anderson MD   Next Visit   3/28/2022 Jordana Anderson MD   ED visits in past 90 days: 0     Note composed:3:04 PM 03/28/2022          Spontaneous, unlabored and symmetrical

## 2022-04-04 ENCOUNTER — APPOINTMENT (OUTPATIENT)
Dept: MAMMOGRAPHY | Facility: CLINIC | Age: 72
End: 2022-04-04

## 2022-04-08 DIAGNOSIS — R82.90 UNSPECIFIED ABNORMAL FINDINGS IN URINE: ICD-10-CM

## 2022-04-08 RX ORDER — LEVOTHYROXINE SODIUM 0.11 MG/1
112 TABLET ORAL
Qty: 90 | Refills: 1 | Status: ACTIVE | COMMUNITY
Start: 2018-03-20 | End: 1900-01-01

## 2022-04-19 ENCOUNTER — NON-APPOINTMENT (OUTPATIENT)
Age: 72
End: 2022-04-19

## 2022-05-11 ENCOUNTER — OUTPATIENT (OUTPATIENT)
Dept: OUTPATIENT SERVICES | Facility: HOSPITAL | Age: 72
LOS: 1 days | End: 2022-05-11
Payer: MEDICARE

## 2022-05-11 VITALS
RESPIRATION RATE: 16 BRPM | HEIGHT: 61 IN | DIASTOLIC BLOOD PRESSURE: 71 MMHG | SYSTOLIC BLOOD PRESSURE: 119 MMHG | HEART RATE: 70 BPM | TEMPERATURE: 98 F | OXYGEN SATURATION: 98 % | WEIGHT: 113.98 LBS

## 2022-05-11 DIAGNOSIS — Z01.818 ENCOUNTER FOR OTHER PREPROCEDURAL EXAMINATION: ICD-10-CM

## 2022-05-11 DIAGNOSIS — J34.89 OTHER SPECIFIED DISORDERS OF NOSE AND NASAL SINUSES: ICD-10-CM

## 2022-05-11 DIAGNOSIS — J34.2 DEVIATED NASAL SEPTUM: ICD-10-CM

## 2022-05-11 DIAGNOSIS — J98.8 OTHER SPECIFIED RESPIRATORY DISORDERS: ICD-10-CM

## 2022-05-11 DIAGNOSIS — J34.3 HYPERTROPHY OF NASAL TURBINATES: ICD-10-CM

## 2022-05-11 LAB
ANION GAP SERPL CALC-SCNC: 6 MMOL/L — SIGNIFICANT CHANGE UP (ref 5–17)
APTT BLD: 30.6 SEC — SIGNIFICANT CHANGE UP (ref 27.5–35.5)
BUN SERPL-MCNC: 29 MG/DL — HIGH (ref 7–23)
CALCIUM SERPL-MCNC: 9.3 MG/DL — SIGNIFICANT CHANGE UP (ref 8.5–10.1)
CHLORIDE SERPL-SCNC: 104 MMOL/L — SIGNIFICANT CHANGE UP (ref 96–108)
CO2 SERPL-SCNC: 28 MMOL/L — SIGNIFICANT CHANGE UP (ref 22–31)
CREAT SERPL-MCNC: 1 MG/DL — SIGNIFICANT CHANGE UP (ref 0.5–1.3)
EGFR: 61 ML/MIN/1.73M2 — SIGNIFICANT CHANGE UP
GLUCOSE SERPL-MCNC: 98 MG/DL — SIGNIFICANT CHANGE UP (ref 70–99)
HCT VFR BLD CALC: 36.9 % — SIGNIFICANT CHANGE UP (ref 34.5–45)
HGB BLD-MCNC: 12.7 G/DL — SIGNIFICANT CHANGE UP (ref 11.5–15.5)
INR BLD: 1.08 RATIO — SIGNIFICANT CHANGE UP (ref 0.88–1.16)
MCHC RBC-ENTMCNC: 30.3 PG — SIGNIFICANT CHANGE UP (ref 27–34)
MCHC RBC-ENTMCNC: 34.4 GM/DL — SIGNIFICANT CHANGE UP (ref 32–36)
MCV RBC AUTO: 88.1 FL — SIGNIFICANT CHANGE UP (ref 80–100)
NRBC # BLD: 0 /100 WBCS — SIGNIFICANT CHANGE UP (ref 0–0)
PLATELET # BLD AUTO: 254 K/UL — SIGNIFICANT CHANGE UP (ref 150–400)
POTASSIUM SERPL-MCNC: 4.3 MMOL/L — SIGNIFICANT CHANGE UP (ref 3.5–5.3)
POTASSIUM SERPL-SCNC: 4.3 MMOL/L — SIGNIFICANT CHANGE UP (ref 3.5–5.3)
PROTHROM AB SERPL-ACNC: 12.6 SEC — SIGNIFICANT CHANGE UP (ref 10.5–13.4)
RBC # BLD: 4.19 M/UL — SIGNIFICANT CHANGE UP (ref 3.8–5.2)
RBC # FLD: 12 % — SIGNIFICANT CHANGE UP (ref 10.3–14.5)
SODIUM SERPL-SCNC: 138 MMOL/L — SIGNIFICANT CHANGE UP (ref 135–145)
WBC # BLD: 5.96 K/UL — SIGNIFICANT CHANGE UP (ref 3.8–10.5)
WBC # FLD AUTO: 5.96 K/UL — SIGNIFICANT CHANGE UP (ref 3.8–10.5)

## 2022-05-11 PROCEDURE — G0463: CPT

## 2022-05-11 PROCEDURE — 85610 PROTHROMBIN TIME: CPT

## 2022-05-11 PROCEDURE — 36415 COLL VENOUS BLD VENIPUNCTURE: CPT

## 2022-05-11 PROCEDURE — 93010 ELECTROCARDIOGRAM REPORT: CPT

## 2022-05-11 PROCEDURE — 85730 THROMBOPLASTIN TIME PARTIAL: CPT

## 2022-05-11 PROCEDURE — 80048 BASIC METABOLIC PNL TOTAL CA: CPT

## 2022-05-11 PROCEDURE — 93005 ELECTROCARDIOGRAM TRACING: CPT

## 2022-05-11 PROCEDURE — 85027 COMPLETE CBC AUTOMATED: CPT

## 2022-05-11 RX ORDER — DULOXETINE HYDROCHLORIDE 30 MG/1
1 CAPSULE, DELAYED RELEASE ORAL
Qty: 0 | Refills: 0 | DISCHARGE

## 2022-05-11 RX ORDER — OMEGA-3 ACID ETHYL ESTERS 1 G
0 CAPSULE ORAL
Qty: 0 | Refills: 0 | DISCHARGE

## 2022-05-11 RX ORDER — LEVOTHYROXINE SODIUM 125 MCG
1 TABLET ORAL
Qty: 0 | Refills: 0 | DISCHARGE

## 2022-05-11 RX ORDER — UBIDECARENONE 100 MG
1 CAPSULE ORAL
Qty: 0 | Refills: 0 | DISCHARGE

## 2022-05-11 NOTE — H&P PST ADULT - CARDIOVASCULAR
negative gait, locomotion, and balance/aerobic capacity/endurance/cognitive impairment/muscle strength detailed exam

## 2022-05-11 NOTE — H&P PST ADULT - FALL HARM RISK - UNIVERSAL INTERVENTIONS
Bed in lowest position, wheels locked, appropriate side rails in place/Call bell, personal items and telephone in reach/Instruct patient to call for assistance before getting out of bed or chair/[place] to call system/Physically safe environment - no spills, clutter or unnecessary equipment/Purposeful Proactive Rounding/Room/bathroom lighting operational, light cord in reach

## 2022-05-11 NOTE — H&P PST ADULT - PROBLEM SELECTOR PLAN 1
scheduled for septoplasty-bilateral turbinoplasty-repair of nasal vestibular stenosis- cartilage craft on 5/17/22.   Pre op testing today.  Pre op instructions:    Medical eval.   Vaccinated for Covid  Hold OTC supplements.   May take Tylenol for pain or headache if needed.    NPO after 11pm to the morning of surgery.   Covid testing advised 3 days prior to procedure, information given.  Patient verbalized understanding. scheduled for septoplasty-bilateral turbinoplasty-repair of nasal vestibular stenosis- cartilage craft on 5/17/22.   Pre op testing today.  Pre op instructions:    Medical eval.   Vaccinated for Covid  Hold OTC supplements.   May take Tylenol for pain or headache if needed.    NPO after 11pm to the morning of surgery.   Covid testing advised 3 days prior to procedure, information given for [place], prescription given  Patient verbalized understanding.

## 2022-05-11 NOTE — H&P PST ADULT - HISTORY OF PRESENT ILLNESS
70 y/o female, PMH of Hypothyroidism, with c/o of sinus problem for many years. Had surgery 40 yrs ago, however last few yrs c/o got worse with headaches, difficulty breathing via the left nostril. The septum has collapsed . Seen by Dr Meadows, cat scan was done last yr, scheduled for septoplasty-bilateral turbinoplasty-repair of nasal vestibular stenosis- cartilage craft on 5/17/22. Pre op testing today.

## 2022-05-13 ENCOUNTER — APPOINTMENT (OUTPATIENT)
Dept: INTERNAL MEDICINE | Facility: CLINIC | Age: 72
End: 2022-05-13
Payer: MEDICARE

## 2022-05-13 VITALS
TEMPERATURE: 98.1 F | WEIGHT: 115 LBS | RESPIRATION RATE: 16 BRPM | HEART RATE: 72 BPM | HEIGHT: 62 IN | BODY MASS INDEX: 21.16 KG/M2 | OXYGEN SATURATION: 95 % | SYSTOLIC BLOOD PRESSURE: 138 MMHG | DIASTOLIC BLOOD PRESSURE: 62 MMHG

## 2022-05-13 DIAGNOSIS — J34.89 OTHER SPECIFIED DISORDERS OF NOSE AND NASAL SINUSES: ICD-10-CM

## 2022-05-13 DIAGNOSIS — Z01.818 ENCOUNTER FOR OTHER PREPROCEDURAL EXAMINATION: ICD-10-CM

## 2022-05-13 PROCEDURE — 99214 OFFICE O/P EST MOD 30 MIN: CPT

## 2022-05-13 RX ORDER — SCOPOLAMINE 1.5 MG/1
1 PATCH, EXTENDED RELEASE TRANSDERMAL
Qty: 1 | Refills: 2 | Status: DISCONTINUED | COMMUNITY
Start: 2022-03-18 | End: 2022-05-13

## 2022-05-13 RX ORDER — SCOPOLAMINE 1 MG/3D
1 PATCH, EXTENDED RELEASE TRANSDERMAL
Qty: 5 | Refills: 0 | Status: DISCONTINUED | COMMUNITY
Start: 2019-06-03 | End: 2022-05-13

## 2022-05-13 RX ORDER — ALBUTEROL SULFATE 90 UG/1
108 (90 BASE) AEROSOL, METERED RESPIRATORY (INHALATION)
Qty: 1 | Refills: 1 | Status: DISCONTINUED | COMMUNITY
Start: 2019-06-04 | End: 2022-05-13

## 2022-05-13 RX ORDER — BECLOMETHASONE DIPROPIONATE HFA 80 UG/1
80 AEROSOL, METERED RESPIRATORY (INHALATION)
Qty: 1 | Refills: 2 | Status: DISCONTINUED | COMMUNITY
Start: 2019-06-04 | End: 2022-05-13

## 2022-05-13 NOTE — PHYSICAL EXAM
[No Acute Distress] : no acute distress [Well-Appearing] : well-appearing [Normal Oropharynx] : the oropharynx was normal [Normal TMs] : both tympanic membranes were normal [No Lymphadenopathy] : no lymphadenopathy [Supple] : supple [No Respiratory Distress] : no respiratory distress  [No Accessory Muscle Use] : no accessory muscle use [Clear to Auscultation] : lungs were clear to auscultation bilaterally [Normal Rate] : normal rate  [Regular Rhythm] : with a regular rhythm [Normal S1, S2] : normal S1 and S2 [No Edema] : there was no peripheral edema [Soft] : abdomen soft [Non Tender] : non-tender [Non-distended] : non-distended [Normal Bowel Sounds] : normal bowel sounds [Normal Gait] : normal gait [Normal Affect] : the affect was normal

## 2022-05-13 NOTE — RESULTS/DATA
[] : results reviewed [de-identified] : NSR, 68 BPM, WNL [de-identified] : Labs [facility]

## 2022-05-13 NOTE — PLAN
[FreeTextEntry1] : 1. Hold OTC supplements, vitamins, NSAIDS- May take Tylenol for pain or headache if needed.\par \par 2. NPO after 11pm to the morning of surgery. May take AM levothyroxine with small sip of water\par \par 3. Patient medically cleared for procedure. PST labs and EKG reviewed \par \par 4. Close airway monitoring with pulse oximetry required\par \par 5. DVT prophylaxis as per surgery\par \par \par Patient verbalized understanding of above \par Will fax  to [phone]

## 2022-05-13 NOTE — HISTORY OF PRESENT ILLNESS
[Self] : previous adverse anesthesia reaction [(Patient denies any chest pain, claudication, dyspnea on exertion, orthopnea, palpitations or syncope)] : Patient denies any chest pain, claudication, dyspnea on exertion, orthopnea, palpitations or syncope [Aortic Stenosis] : no aortic stenosis [Atrial Fibrillation] : no atrial fibrillation [Coronary Artery Disease] : no coronary artery disease [Recent Myocardial Infarction] : no recent myocardial infarction [Implantable Device/Pacemaker] : no implantable device/pacemaker [Asthma] : no asthma [COPD] : no COPD [Sleep Apnea] : no sleep apnea [Smoker] : not a smoker [Chronic Anticoagulation] : no chronic anticoagulation [Chronic Kidney Disease] : no chronic kidney disease [Diabetes] : no diabetes [FreeTextEntry1] : septoplasty-bilateral\par turbinoplasty-repair of nasal vestibular stenosis [FreeTextEntry2] : 5/17/2022 [FreeTextEntry3] : Dr Meadows [FreeTextEntry4] : 72 y/o female, PMH of Hypothyroidism, HLD, presents to office today for MCA for septoplasty-bilateral turbinoplasty-repair of nasal vestibular stenosis on 5/17/22 with Dr. Henry. Patient with c/o of sinus problem for many years. Had surgery 40 yrs ago, however last few yrs c/o got worse with headaches, difficulty breathing via the left nostril. The septum has collapsed. \par \par Patient reports she feels well today, no complaints today [FreeTextEntry5] : Patient reports nausea with anesthesia   [FreeTextEntry6] : Patient is able to walk 1 block and is able to go up 1 flight of stairs with out difficulty  [FreeTextEntry7] : No prior cardiac hx or evaluation

## 2022-05-16 ENCOUNTER — TRANSCRIPTION ENCOUNTER (OUTPATIENT)
Age: 72
End: 2022-05-16

## 2022-05-16 RX ORDER — SODIUM CHLORIDE 9 MG/ML
1000 INJECTION, SOLUTION INTRAVENOUS
Refills: 0 | Status: DISCONTINUED | OUTPATIENT
Start: 2022-05-17 | End: 2022-05-17

## 2022-05-16 NOTE — ASU PATIENT PROFILE, ADULT - FALL HARM RISK - UNIVERSAL INTERVENTIONS
Bed in lowest position, wheels locked, appropriate side rails in place/Call bell, personal items and telephone in reach/Instruct patient to call for assistance before getting out of bed or chair/Non-slip footwear when patient is out of bed/[place] to call system/Physically safe environment - no spills, clutter or unnecessary equipment/Purposeful Proactive Rounding/Room/bathroom lighting operational, light cord in reach

## 2022-05-17 ENCOUNTER — RESULT REVIEW (OUTPATIENT)
Age: 72
End: 2022-05-17

## 2022-05-17 ENCOUNTER — TRANSCRIPTION ENCOUNTER (OUTPATIENT)
Age: 72
End: 2022-05-17

## 2022-05-17 ENCOUNTER — OUTPATIENT (OUTPATIENT)
Dept: OUTPATIENT SERVICES | Facility: HOSPITAL | Age: 72
LOS: 1 days | End: 2022-05-17
Payer: MEDICARE

## 2022-05-17 VITALS
WEIGHT: 113.98 LBS | HEART RATE: 62 BPM | SYSTOLIC BLOOD PRESSURE: 112 MMHG | TEMPERATURE: 98 F | RESPIRATION RATE: 14 BRPM | HEIGHT: 61 IN | DIASTOLIC BLOOD PRESSURE: 63 MMHG | OXYGEN SATURATION: 100 %

## 2022-05-17 VITALS
HEART RATE: 70 BPM | DIASTOLIC BLOOD PRESSURE: 61 MMHG | SYSTOLIC BLOOD PRESSURE: 128 MMHG | RESPIRATION RATE: 12 BRPM | OXYGEN SATURATION: 97 %

## 2022-05-17 DIAGNOSIS — J34.89 OTHER SPECIFIED DISORDERS OF NOSE AND NASAL SINUSES: ICD-10-CM

## 2022-05-17 DIAGNOSIS — J98.8 OTHER SPECIFIED RESPIRATORY DISORDERS: ICD-10-CM

## 2022-05-17 DIAGNOSIS — J34.2 DEVIATED NASAL SEPTUM: ICD-10-CM

## 2022-05-17 DIAGNOSIS — J34.3 HYPERTROPHY OF NASAL TURBINATES: ICD-10-CM

## 2022-05-17 DIAGNOSIS — Z01.818 ENCOUNTER FOR OTHER PREPROCEDURAL EXAMINATION: ICD-10-CM

## 2022-05-17 PROCEDURE — 30410 RECONSTRUCTION OF NOSE: CPT

## 2022-05-17 PROCEDURE — C1889: CPT

## 2022-05-17 PROCEDURE — 20912 REMOVE CARTILAGE FOR GRAFT: CPT

## 2022-05-17 PROCEDURE — 30465 REPAIR NASAL STENOSIS: CPT | Mod: XU

## 2022-05-17 PROCEDURE — 88300 SURGICAL PATH GROSS: CPT

## 2022-05-17 PROCEDURE — 30930 THER FX NASAL INF TURBINATE: CPT

## 2022-05-17 PROCEDURE — 88300 SURGICAL PATH GROSS: CPT | Mod: 26

## 2022-05-17 DEVICE — SURGICEL 2 X 14": Type: IMPLANTABLE DEVICE | Status: FUNCTIONAL

## 2022-05-17 DEVICE — PACKING NASAL MEROGEL 4X4CM: Type: IMPLANTABLE DEVICE | Status: FUNCTIONAL

## 2022-05-17 DEVICE — SHEET SILICONE 5X5CMX.5MM: Type: IMPLANTABLE DEVICE | Status: FUNCTIONAL

## 2022-05-17 RX ORDER — LEVOTHYROXINE SODIUM 125 MCG
1 TABLET ORAL
Qty: 0 | Refills: 0 | DISCHARGE

## 2022-05-17 RX ORDER — DULOXETINE HYDROCHLORIDE 30 MG/1
0 CAPSULE, DELAYED RELEASE ORAL
Qty: 0 | Refills: 0 | DISCHARGE

## 2022-05-17 RX ORDER — ONDANSETRON 8 MG/1
4 TABLET, FILM COATED ORAL ONCE
Refills: 0 | Status: DISCONTINUED | OUTPATIENT
Start: 2022-05-17 | End: 2022-05-17

## 2022-05-17 RX ORDER — ACETAMINOPHEN 500 MG
1000 TABLET ORAL ONCE
Refills: 0 | Status: COMPLETED | OUTPATIENT
Start: 2022-05-17 | End: 2022-05-17

## 2022-05-17 RX ORDER — HYDROMORPHONE HYDROCHLORIDE 2 MG/ML
0.5 INJECTION INTRAMUSCULAR; INTRAVENOUS; SUBCUTANEOUS
Refills: 0 | Status: DISCONTINUED | OUTPATIENT
Start: 2022-05-17 | End: 2022-05-17

## 2022-05-17 RX ORDER — ATORVASTATIN CALCIUM 80 MG/1
1 TABLET, FILM COATED ORAL
Qty: 0 | Refills: 0 | DISCHARGE

## 2022-05-17 RX ORDER — OMEGA-3 ACID ETHYL ESTERS 1 G
1 CAPSULE ORAL
Qty: 0 | Refills: 0 | DISCHARGE

## 2022-05-17 RX ORDER — CEFAZOLIN SODIUM 1 G
2000 VIAL (EA) INJECTION ONCE
Refills: 0 | Status: COMPLETED | OUTPATIENT
Start: 2022-05-17 | End: 2022-05-17

## 2022-05-17 RX ORDER — SODIUM CHLORIDE 9 MG/ML
1000 INJECTION, SOLUTION INTRAVENOUS
Refills: 0 | Status: DISCONTINUED | OUTPATIENT
Start: 2022-05-17 | End: 2022-05-17

## 2022-05-17 RX ORDER — APREPITANT 80 MG/1
40 CAPSULE ORAL ONCE
Refills: 0 | Status: DISCONTINUED | OUTPATIENT
Start: 2022-05-17 | End: 2022-05-17

## 2022-05-17 RX ADMIN — SODIUM CHLORIDE 75 MILLILITER(S): 9 INJECTION, SOLUTION INTRAVENOUS at 18:52

## 2022-05-17 NOTE — ASU DISCHARGE PLAN (ADULT/PEDIATRIC) - CARE PROVIDER_API CALL
Rafael Meadows)  Facial Plastic and Reconstructive Surgery; Otolaryngology  [address] [place] [address]  Phone: [phone]  Fax: [phone]  Follow Up Time:

## 2022-05-17 NOTE — BRIEF OPERATIVE NOTE - NSICDXBRIEFPROCEDURE_GEN_ALL_CORE_FT
PROCEDURES:  Septoplasty 17-May-2022 18:49:32  Rafael Meadows  Turbinoplasty, with submucous resection 17-May-2022 18:49:45  Rafael Meadows  Repair of nasal valve 17-May-2022 18:50:31  Rafael Meadows

## 2022-05-17 NOTE — BRIEF OPERATIVE NOTE - NSICDXBRIEFPOSTOP_GEN_ALL_CORE_FT
POST-OP DIAGNOSIS:  Deviated septum 17-May-2022 18:51:06  Rafael Meadows  Nasal turbinate hypertrophy 17-May-2022 18:51:14  Rafael Meadows  Nasal valve stenosis 17-May-2022 18:51:23  Rafael Meadows

## 2022-05-17 NOTE — BRIEF OPERATIVE NOTE - NSICDXBRIEFPREOP_GEN_ALL_CORE_FT
PRE-OP DIAGNOSIS:  Deviated septum 17-May-2022 18:50:42  Rafael Meadows  Nasal turbinate hypertrophy 17-May-2022 18:50:46  Rafael Meadows  Nasal valve stenosis 17-May-2022 18:50:56  Rafael Meadows

## 2022-05-26 ENCOUNTER — APPOINTMENT (OUTPATIENT)
Dept: OBGYN | Facility: CLINIC | Age: 72
End: 2022-05-26
Payer: MEDICARE

## 2022-05-26 VITALS
WEIGHT: 117 LBS | HEIGHT: 62 IN | BODY MASS INDEX: 21.53 KG/M2 | DIASTOLIC BLOOD PRESSURE: 70 MMHG | SYSTOLIC BLOOD PRESSURE: 114 MMHG

## 2022-05-26 DIAGNOSIS — Z12.39 ENCOUNTER FOR OTHER SCREENING FOR MALIGNANT NEOPLASM OF BREAST: ICD-10-CM

## 2022-05-26 PROCEDURE — 99213 OFFICE O/P EST LOW 20 MIN: CPT

## 2022-06-21 ENCOUNTER — APPOINTMENT (OUTPATIENT)
Dept: OBGYN | Facility: CLINIC | Age: 72
End: 2022-06-21
Payer: MEDICARE

## 2022-06-21 VITALS
SYSTOLIC BLOOD PRESSURE: 112 MMHG | WEIGHT: 113 LBS | BODY MASS INDEX: 20.8 KG/M2 | HEIGHT: 62 IN | DIASTOLIC BLOOD PRESSURE: 62 MMHG

## 2022-06-21 PROCEDURE — 99212 OFFICE O/P EST SF 10 MIN: CPT | Mod: 25

## 2022-06-21 PROCEDURE — 96372 THER/PROPH/DIAG INJ SC/IM: CPT

## 2022-12-22 ENCOUNTER — APPOINTMENT (OUTPATIENT)
Dept: OBGYN | Facility: CLINIC | Age: 72
End: 2022-12-22
Payer: MEDICARE

## 2022-12-22 VITALS
BODY MASS INDEX: 22.08 KG/M2 | DIASTOLIC BLOOD PRESSURE: 70 MMHG | TEMPERATURE: 98.6 F | HEIGHT: 62 IN | SYSTOLIC BLOOD PRESSURE: 120 MMHG | WEIGHT: 120 LBS

## 2022-12-22 PROCEDURE — 96401 CHEMO ANTI-NEOPL SQ/IM: CPT

## 2022-12-22 PROCEDURE — 99213 OFFICE O/P EST LOW 20 MIN: CPT | Mod: 25

## 2022-12-22 RX ORDER — DENOSUMAB 60 MG/ML
60 INJECTION SUBCUTANEOUS
Qty: 1 | Refills: 0 | Status: COMPLETED | OUTPATIENT
Start: 2022-12-22

## 2022-12-22 RX ADMIN — DENOSUMAB 0 MG/ML: 60 INJECTION SUBCUTANEOUS at 00:00

## 2022-12-22 NOTE — DISCUSSION/SUMMARY
[FreeTextEntry1] : 1) Prolia administered\par 2) rx for 2023 DEXA issued\par \par Return to office in 6 months, sooner prn

## 2022-12-22 NOTE — HISTORY OF PRESENT ILLNESS
[Currently In Menopause] : currently in menopause [FreeTextEntry1] : Linn is a 73 y/o who presents today for Prolia injecdtion.\par \par She exercises regularly and supplements with Calcium and vitamin D3.\par \par Her dentist is aware she is taking Prolia [BoneDensityDate] : 3/18/21

## 2023-03-28 ENCOUNTER — OUTPATIENT (OUTPATIENT)
Dept: OUTPATIENT SERVICES | Facility: HOSPITAL | Age: 73
LOS: 1 days | End: 2023-03-28
Payer: MEDICARE

## 2023-03-28 ENCOUNTER — APPOINTMENT (OUTPATIENT)
Dept: RADIOLOGY | Facility: CLINIC | Age: 73
End: 2023-03-28
Payer: MEDICARE

## 2023-03-28 DIAGNOSIS — M81.0 AGE-RELATED OSTEOPOROSIS WITHOUT CURRENT PATHOLOGICAL FRACTURE: ICD-10-CM

## 2023-03-28 PROCEDURE — 77085 DXA BONE DENSITY AXL VRT FX: CPT

## 2023-03-28 PROCEDURE — 77085 DXA BONE DENSITY AXL VRT FX: CPT | Mod: 26

## 2023-04-21 ENCOUNTER — APPOINTMENT (OUTPATIENT)
Dept: INTERNAL MEDICINE | Facility: CLINIC | Age: 73
End: 2023-04-21
Payer: MEDICARE

## 2023-04-21 VITALS
DIASTOLIC BLOOD PRESSURE: 80 MMHG | SYSTOLIC BLOOD PRESSURE: 110 MMHG | OXYGEN SATURATION: 97 % | HEART RATE: 64 BPM | WEIGHT: 115 LBS | RESPIRATION RATE: 16 BRPM | HEIGHT: 62 IN | BODY MASS INDEX: 21.16 KG/M2 | TEMPERATURE: 98.2 F

## 2023-04-21 DIAGNOSIS — Z00.00 ENCOUNTER FOR GENERAL ADULT MEDICAL EXAMINATION W/OUT ABNORMAL FINDINGS: ICD-10-CM

## 2023-04-21 PROCEDURE — 99213 OFFICE O/P EST LOW 20 MIN: CPT

## 2023-04-21 NOTE — PLAN
[FreeTextEntry1] : 1. Atorvastatin filled at patients request- No changes to regimen at this time\par \par 2. Patient given RX for full fasting labs, to be done prior to next visit\par \par 3. F/u 7/2023 or sooner if needed

## 2023-04-21 NOTE — HISTORY OF PRESENT ILLNESS
[FreeTextEntry1] : F/U [de-identified] : The patient is a 72-year-old female with PMH of Hypothyroidism, HLD, presents to office today for F/U visit. Patient reports surgery 5/2022 went well. She is requesting refill on her atorvastatin. No acute complaints today.

## 2023-06-01 NOTE — ED STATDOCS - CHPI ED AGGRAVATING FACTORS
nothing Methotrexate Counseling:  Patient counseled regarding adverse effects of methotrexate including but not limited to nausea, vomiting, abnormalities in liver function tests. Patients may develop mouth sores, rash, diarrhea, and abnormalities in blood counts. The patient understands that monitoring is required including LFT's and blood counts.  There is a rare possibility of scarring of the liver and lung problems that can occur when taking methotrexate. Persistent nausea, loss of appetite, pale stools, dark urine, cough, and shortness of breath should be reported immediately. Patient advised to discontinue methotrexate treatment at least three months before attempting to become pregnant.  I discussed the need for folate supplements while taking methotrexate.  These supplements can decrease side effects during methotrexate treatment. The patient verbalized understanding of the proper use and possible adverse effects of methotrexate.  All of the patient's questions and concerns were addressed.

## 2023-06-27 ENCOUNTER — APPOINTMENT (OUTPATIENT)
Dept: OBGYN | Facility: CLINIC | Age: 73
End: 2023-06-27
Payer: MEDICARE

## 2023-06-27 VITALS — BODY MASS INDEX: 20.3 KG/M2 | DIASTOLIC BLOOD PRESSURE: 64 MMHG | SYSTOLIC BLOOD PRESSURE: 116 MMHG | WEIGHT: 111 LBS

## 2023-06-27 PROCEDURE — 96401 CHEMO ANTI-NEOPL SQ/IM: CPT

## 2023-06-27 PROCEDURE — 99214 OFFICE O/P EST MOD 30 MIN: CPT | Mod: 25

## 2023-07-10 ENCOUNTER — APPOINTMENT (OUTPATIENT)
Dept: OBGYN | Facility: CLINIC | Age: 73
End: 2023-07-10
Payer: MEDICARE

## 2023-07-10 VITALS
WEIGHT: 111 LBS | SYSTOLIC BLOOD PRESSURE: 106 MMHG | DIASTOLIC BLOOD PRESSURE: 62 MMHG | HEIGHT: 62 IN | BODY MASS INDEX: 20.43 KG/M2

## 2023-07-10 DIAGNOSIS — Z01.419 ENCOUNTER FOR GYNECOLOGICAL EXAMINATION (GENERAL) (ROUTINE) W/OUT ABNORMAL FINDINGS: ICD-10-CM

## 2023-07-10 PROCEDURE — 99397 PER PM REEVAL EST PAT 65+ YR: CPT | Mod: GY

## 2023-07-11 PROBLEM — Z01.419 ENCOUNTER FOR ANNUAL ROUTINE GYNECOLOGICAL EXAMINATION: Status: ACTIVE | Noted: 2022-05-26

## 2023-08-11 ENCOUNTER — APPOINTMENT (OUTPATIENT)
Dept: INTERNAL MEDICINE | Facility: CLINIC | Age: 73
End: 2023-08-11
Payer: MEDICARE

## 2023-08-11 VITALS
OXYGEN SATURATION: 98 % | HEART RATE: 62 BPM | SYSTOLIC BLOOD PRESSURE: 102 MMHG | BODY MASS INDEX: 20.8 KG/M2 | HEIGHT: 62 IN | WEIGHT: 113 LBS | DIASTOLIC BLOOD PRESSURE: 54 MMHG | TEMPERATURE: 97.6 F | RESPIRATION RATE: 16 BRPM

## 2023-08-11 DIAGNOSIS — T75.3XXA MOTION SICKNESS, INITIAL ENCOUNTER: ICD-10-CM

## 2023-08-11 DIAGNOSIS — Z00.00 ENCOUNTER FOR GENERAL ADULT MEDICAL EXAMINATION W/OUT ABNORMAL FINDINGS: ICD-10-CM

## 2023-08-11 DIAGNOSIS — E03.9 HYPOTHYROIDISM, UNSPECIFIED: ICD-10-CM

## 2023-08-11 DIAGNOSIS — E78.5 HYPERLIPIDEMIA, UNSPECIFIED: ICD-10-CM

## 2023-08-11 PROCEDURE — G0439: CPT

## 2023-08-11 RX ORDER — SCOPOLAMINE 1.5 MG/1
1 PATCH, EXTENDED RELEASE TRANSDERMAL
Qty: 1 | Refills: 1 | Status: ACTIVE | COMMUNITY
Start: 2023-08-11 | End: 1900-01-01

## 2023-08-11 NOTE — HEALTH RISK ASSESSMENT
[Never (0 pts)] : Never (0 points) [No] : In the past 12 months have you used drugs other than those required for medical reasons? No [0] : 2) Feeling down, depressed, or hopeless: Not at all (0) [Patient reported mammogram was normal] : Patient reported mammogram was normal [Patient reported PAP Smear was normal] : Patient reported PAP Smear was normal [Never] : Never [Excellent] : ~his/her~  mood as  excellent [MammogramDate] : 01/22 [PapSmearDate] : 05/23

## 2023-08-11 NOTE — PLAN
[FreeTextEntry1] : Ms. Samaniego presents for an annual physical examination.  1.  She will continue on current medication regimen which has been reviewed and revised.  2.  Prescription for CBC, CMP, hemoglobin A1c, iron levels, lipid profile, TSH level and urinalysis.  3.  She continues to follow-up with GYN for mammogram, Pap smear and bone density test.  She is currently on Prolia injections for osteoporosis.  Her bone density has improved.  4.  Patient has been given a prescription for scopolamine patch for seasickness.  She will be going on vacation to [place] and part of it will be on a cruise.  5.  Follow-up in 6 months.

## 2023-08-11 NOTE — HISTORY OF PRESENT ILLNESS
[FreeTextEntry1] : Annual physical examination [de-identified] : Mrs. Samaniego presents for an annual physical examination.  She is feeling well.  Patient exercises on a regular basis.  She denies any chest pain, shortness of breath or palpitations.

## 2023-08-31 ENCOUNTER — APPOINTMENT (OUTPATIENT)
Dept: MAMMOGRAPHY | Facility: CLINIC | Age: 73
End: 2023-08-31
Payer: MEDICARE

## 2023-08-31 ENCOUNTER — APPOINTMENT (OUTPATIENT)
Dept: ULTRASOUND IMAGING | Facility: CLINIC | Age: 73
End: 2023-08-31
Payer: MEDICARE

## 2023-08-31 ENCOUNTER — OUTPATIENT (OUTPATIENT)
Dept: OUTPATIENT SERVICES | Facility: HOSPITAL | Age: 73
LOS: 1 days | End: 2023-08-31
Payer: MEDICARE

## 2023-08-31 ENCOUNTER — RESULT REVIEW (OUTPATIENT)
Age: 73
End: 2023-08-31

## 2023-08-31 DIAGNOSIS — Z12.39 ENCOUNTER FOR OTHER SCREENING FOR MALIGNANT NEOPLASM OF BREAST: ICD-10-CM

## 2023-08-31 PROCEDURE — 76641 ULTRASOUND BREAST COMPLETE: CPT | Mod: 26,50,GZ

## 2023-08-31 PROCEDURE — 77067 SCR MAMMO BI INCL CAD: CPT

## 2023-08-31 PROCEDURE — 77063 BREAST TOMOSYNTHESIS BI: CPT | Mod: 26

## 2023-08-31 PROCEDURE — 77063 BREAST TOMOSYNTHESIS BI: CPT

## 2023-08-31 PROCEDURE — 76641 ULTRASOUND BREAST COMPLETE: CPT

## 2023-08-31 PROCEDURE — 77067 SCR MAMMO BI INCL CAD: CPT | Mod: 26

## 2023-10-15 ENCOUNTER — RX RENEWAL (OUTPATIENT)
Age: 73
End: 2023-10-15

## 2024-01-04 ENCOUNTER — APPOINTMENT (OUTPATIENT)
Dept: OBGYN | Facility: CLINIC | Age: 74
End: 2024-01-04
Payer: MEDICARE

## 2024-01-04 VITALS
SYSTOLIC BLOOD PRESSURE: 106 MMHG | HEIGHT: 62 IN | DIASTOLIC BLOOD PRESSURE: 60 MMHG | BODY MASS INDEX: 20.61 KG/M2 | WEIGHT: 112 LBS

## 2024-01-04 DIAGNOSIS — M81.0 AGE-RELATED OSTEOPOROSIS W/OUT CURRENT PATHOLOGICAL FRACTURE: ICD-10-CM

## 2024-01-04 PROCEDURE — 99213 OFFICE O/P EST LOW 20 MIN: CPT

## 2024-01-07 PROBLEM — M81.0 OSTEOPOROSIS: Status: ACTIVE | Noted: 2021-04-27

## 2024-02-09 ENCOUNTER — APPOINTMENT (OUTPATIENT)
Dept: INTERNAL MEDICINE | Facility: CLINIC | Age: 74
End: 2024-02-09

## 2024-02-22 NOTE — H&P PST ADULT - CENTRAL VENOUS CATHETER
Initiate Treatment: triamcinolone acetonide 0.1 % topical cream \\nclobetasol 0.05 % scalp solution
no
Detail Level: Zone
Render In Strict Bullet Format?: No

## 2024-04-17 ENCOUNTER — RX RENEWAL (OUTPATIENT)
Age: 74
End: 2024-04-17

## 2024-04-17 RX ORDER — ATORVASTATIN CALCIUM 10 MG/1
10 TABLET, FILM COATED ORAL
Qty: 90 | Refills: 1 | Status: ACTIVE | COMMUNITY
Start: 2021-01-26 | End: 1900-01-01

## 2024-07-29 ENCOUNTER — APPOINTMENT (OUTPATIENT)
Dept: OBGYN | Facility: CLINIC | Age: 74
End: 2024-07-29
Payer: MEDICARE

## 2024-07-29 VITALS
HEIGHT: 62 IN | BODY MASS INDEX: 20.24 KG/M2 | WEIGHT: 110 LBS | DIASTOLIC BLOOD PRESSURE: 68 MMHG | SYSTOLIC BLOOD PRESSURE: 110 MMHG

## 2024-07-29 DIAGNOSIS — Z01.419 ENCOUNTER FOR GYNECOLOGICAL EXAMINATION (GENERAL) (ROUTINE) W/OUT ABNORMAL FINDINGS: ICD-10-CM

## 2024-07-29 PROCEDURE — G0101: CPT

## 2024-08-05 ENCOUNTER — APPOINTMENT (OUTPATIENT)
Dept: OBGYN | Facility: CLINIC | Age: 74
End: 2024-08-05

## 2024-08-05 PROCEDURE — 96372 THER/PROPH/DIAG INJ SC/IM: CPT

## 2024-09-04 ENCOUNTER — RESULT REVIEW (OUTPATIENT)
Age: 74
End: 2024-09-04

## 2024-09-04 ENCOUNTER — APPOINTMENT (OUTPATIENT)
Dept: ULTRASOUND IMAGING | Facility: CLINIC | Age: 74
End: 2024-09-04
Payer: MEDICARE

## 2024-09-04 ENCOUNTER — OUTPATIENT (OUTPATIENT)
Dept: OUTPATIENT SERVICES | Facility: HOSPITAL | Age: 74
LOS: 1 days | End: 2024-09-04
Payer: MEDICARE

## 2024-09-04 ENCOUNTER — APPOINTMENT (OUTPATIENT)
Dept: MAMMOGRAPHY | Facility: CLINIC | Age: 74
End: 2024-09-04
Payer: MEDICARE

## 2024-09-04 DIAGNOSIS — Z12.39 ENCOUNTER FOR OTHER SCREENING FOR MALIGNANT NEOPLASM OF BREAST: ICD-10-CM

## 2024-09-04 PROCEDURE — 77067 SCR MAMMO BI INCL CAD: CPT

## 2024-09-04 PROCEDURE — 77067 SCR MAMMO BI INCL CAD: CPT | Mod: 26

## 2024-09-04 PROCEDURE — 77063 BREAST TOMOSYNTHESIS BI: CPT | Mod: 26

## 2024-09-04 PROCEDURE — 76641 ULTRASOUND BREAST COMPLETE: CPT | Mod: 26,50,GZ

## 2024-09-04 PROCEDURE — 76641 ULTRASOUND BREAST COMPLETE: CPT

## 2024-09-04 PROCEDURE — 77063 BREAST TOMOSYNTHESIS BI: CPT

## 2024-10-21 ENCOUNTER — RX RENEWAL (OUTPATIENT)
Age: 74
End: 2024-10-21

## 2024-11-13 ENCOUNTER — APPOINTMENT (OUTPATIENT)
Dept: INTERNAL MEDICINE | Facility: CLINIC | Age: 74
End: 2024-11-13
Payer: MEDICARE

## 2024-11-13 VITALS
TEMPERATURE: 98.2 F | SYSTOLIC BLOOD PRESSURE: 122 MMHG | HEIGHT: 61 IN | OXYGEN SATURATION: 96 % | DIASTOLIC BLOOD PRESSURE: 66 MMHG | BODY MASS INDEX: 20.77 KG/M2 | WEIGHT: 110 LBS | HEART RATE: 71 BPM

## 2024-11-13 DIAGNOSIS — E78.5 HYPERLIPIDEMIA, UNSPECIFIED: ICD-10-CM

## 2024-11-13 DIAGNOSIS — Z87.09 PERSONAL HISTORY OF OTHER DISEASES OF THE RESPIRATORY SYSTEM: ICD-10-CM

## 2024-11-13 DIAGNOSIS — Z87.898 PERSONAL HISTORY OF OTHER SPECIFIED CONDITIONS: ICD-10-CM

## 2024-11-13 DIAGNOSIS — T75.3XXA MOTION SICKNESS, INITIAL ENCOUNTER: ICD-10-CM

## 2024-11-13 DIAGNOSIS — M86.9 OSTEOMYELITIS, UNSPECIFIED: ICD-10-CM

## 2024-11-13 DIAGNOSIS — H90.3 SENSORINEURAL HEARING LOSS, BILATERAL: ICD-10-CM

## 2024-11-13 DIAGNOSIS — M81.0 AGE-RELATED OSTEOPOROSIS W/OUT CURRENT PATHOLOGICAL FRACTURE: ICD-10-CM

## 2024-11-13 DIAGNOSIS — H93.13 TINNITUS, BILATERAL: ICD-10-CM

## 2024-11-13 DIAGNOSIS — E03.9 HYPOTHYROIDISM, UNSPECIFIED: ICD-10-CM

## 2024-11-13 DIAGNOSIS — G43.709 CHRONIC MIGRAINE W/OUT AURA, NOT INTRACTABLE, W/OUT STATUS MIGRAINOSUS: ICD-10-CM

## 2024-11-13 DIAGNOSIS — G44.229 CHRONIC TENSION-TYPE HEADACHE, NOT INTRACTABLE: ICD-10-CM

## 2024-11-13 DIAGNOSIS — R82.90 UNSPECIFIED ABNORMAL FINDINGS IN URINE: ICD-10-CM

## 2024-11-13 DIAGNOSIS — Z00.00 ENCOUNTER FOR GENERAL ADULT MEDICAL EXAMINATION W/OUT ABNORMAL FINDINGS: ICD-10-CM

## 2024-11-13 DIAGNOSIS — M54.16 RADICULOPATHY, LUMBAR REGION: ICD-10-CM

## 2024-11-13 DIAGNOSIS — Z01.818 ENCOUNTER FOR OTHER PREPROCEDURAL EXAMINATION: ICD-10-CM

## 2024-11-13 PROCEDURE — G0439: CPT

## 2025-01-20 NOTE — ED ADULT TRIAGE NOTE - PAIN RATING/NUMBER SCALE (0-10): ACTIVITY
RN spoke to patient to follow up with previous message that was sent by patient on 1/13. Patient verified identification using two identifiers. Per patient, cough has been resolved and only experiencing back pain from the cough that she was experiencing. RN advise patient that if symptom arises again or if worsen to go to the nearest [facility]/urgent care. Patient verbalized understating.   
8

## 2025-02-10 ENCOUNTER — APPOINTMENT (OUTPATIENT)
Dept: OBGYN | Facility: CLINIC | Age: 75
End: 2025-02-10
Payer: MEDICARE

## 2025-02-10 DIAGNOSIS — M81.0 AGE-RELATED OSTEOPOROSIS W/OUT CURRENT PATHOLOGICAL FRACTURE: ICD-10-CM

## 2025-02-10 PROCEDURE — 96372 THER/PROPH/DIAG INJ SC/IM: CPT

## 2025-03-31 ENCOUNTER — APPOINTMENT (OUTPATIENT)
Dept: RADIOLOGY | Facility: CLINIC | Age: 75
End: 2025-03-31
Payer: MEDICARE

## 2025-03-31 ENCOUNTER — OUTPATIENT (OUTPATIENT)
Dept: OUTPATIENT SERVICES | Facility: HOSPITAL | Age: 75
LOS: 1 days | End: 2025-03-31
Payer: MEDICARE

## 2025-03-31 DIAGNOSIS — M81.0 AGE-RELATED OSTEOPOROSIS WITHOUT CURRENT PATHOLOGICAL FRACTURE: ICD-10-CM

## 2025-03-31 PROCEDURE — 77085 DXA BONE DENSITY AXL VRT FX: CPT | Mod: 26

## 2025-03-31 PROCEDURE — 77085 DXA BONE DENSITY AXL VRT FX: CPT

## 2025-04-22 ENCOUNTER — RX RENEWAL (OUTPATIENT)
Age: 75
End: 2025-04-22

## 2025-05-13 ENCOUNTER — APPOINTMENT (OUTPATIENT)
Dept: INTERNAL MEDICINE | Facility: CLINIC | Age: 75
End: 2025-05-13
Payer: MEDICARE

## 2025-05-13 VITALS
OXYGEN SATURATION: 99 % | HEIGHT: 61 IN | DIASTOLIC BLOOD PRESSURE: 64 MMHG | SYSTOLIC BLOOD PRESSURE: 110 MMHG | BODY MASS INDEX: 20.39 KG/M2 | HEART RATE: 69 BPM | TEMPERATURE: 98.2 F | RESPIRATION RATE: 16 BRPM | WEIGHT: 108 LBS

## 2025-05-13 DIAGNOSIS — M81.0 AGE-RELATED OSTEOPOROSIS W/OUT CURRENT PATHOLOGICAL FRACTURE: ICD-10-CM

## 2025-05-13 DIAGNOSIS — E03.9 HYPOTHYROIDISM, UNSPECIFIED: ICD-10-CM

## 2025-05-13 DIAGNOSIS — E78.5 HYPERLIPIDEMIA, UNSPECIFIED: ICD-10-CM

## 2025-05-13 DIAGNOSIS — R53.83 OTHER FATIGUE: ICD-10-CM

## 2025-05-13 PROCEDURE — 99214 OFFICE O/P EST MOD 30 MIN: CPT

## 2025-05-13 PROCEDURE — G2211 COMPLEX E/M VISIT ADD ON: CPT

## 2025-06-19 PROBLEM — R17 ELEVATED BILIRUBIN: Status: ACTIVE | Noted: 2025-06-19

## 2025-07-16 ENCOUNTER — APPOINTMENT (OUTPATIENT)
Dept: ULTRASOUND IMAGING | Facility: CLINIC | Age: 75
End: 2025-07-16
Payer: MEDICARE

## 2025-07-16 ENCOUNTER — OUTPATIENT (OUTPATIENT)
Dept: OUTPATIENT SERVICES | Facility: HOSPITAL | Age: 75
LOS: 1 days | End: 2025-07-16
Payer: MEDICARE

## 2025-07-16 DIAGNOSIS — R17 UNSPECIFIED JAUNDICE: ICD-10-CM

## 2025-07-16 PROCEDURE — 76700 US EXAM ABDOM COMPLETE: CPT | Mod: 26

## 2025-07-16 PROCEDURE — 76700 US EXAM ABDOM COMPLETE: CPT

## 2025-08-11 ENCOUNTER — APPOINTMENT (OUTPATIENT)
Dept: OBGYN | Facility: CLINIC | Age: 75
End: 2025-08-11
Payer: MEDICARE

## 2025-08-11 VITALS
HEIGHT: 61 IN | SYSTOLIC BLOOD PRESSURE: 114 MMHG | BODY MASS INDEX: 20.39 KG/M2 | DIASTOLIC BLOOD PRESSURE: 60 MMHG | WEIGHT: 108 LBS

## 2025-08-11 DIAGNOSIS — Z01.419 ENCOUNTER FOR GYNECOLOGICAL EXAMINATION (GENERAL) (ROUTINE) W/OUT ABNORMAL FINDINGS: ICD-10-CM

## 2025-08-11 DIAGNOSIS — M81.0 AGE-RELATED OSTEOPOROSIS W/OUT CURRENT PATHOLOGICAL FRACTURE: ICD-10-CM

## 2025-08-11 PROCEDURE — 96372 THER/PROPH/DIAG INJ SC/IM: CPT

## (undated) DEVICE — DRSG AQUAPLAST TIE DOWN 6X9" IVORY 8 SHEETS

## (undated) DEVICE — DRSG STERISTRIPS 0.5 X 4"

## (undated) DEVICE — SUT CHROMIC 4-0 18" P-3

## (undated) DEVICE — GLV 7.5 PROTEXIS (WHITE)

## (undated) DEVICE — WARMING BLANKET LOWER ADULT

## (undated) DEVICE — DRSG MASTISOL

## (undated) DEVICE — SYR LUER LOK 20CC

## (undated) DEVICE — DRSG KLING 2"

## (undated) DEVICE — NDL HYPO SAFE 18G X 1.5" (PINK)

## (undated) DEVICE — VENODYNE/SCD SLEEVE CALF LARGE

## (undated) DEVICE — SUT MONOSOF 3-0 18" C-14

## (undated) DEVICE — ELCTR BOVIE TIP BLADE INSULATED 2.75" EDGE

## (undated) DEVICE — BLADE SCALPEL SAFETYLOCK #15

## (undated) DEVICE — SUT PLAIN GUT FAST ABSORBING 5-0 PC-1

## (undated) DEVICE — SOL IRR POUR NS 0.9% 1000ML

## (undated) DEVICE — PACK NASAL

## (undated) DEVICE — PROTECTOR HEEL / ELBOW FLUFFY

## (undated) DEVICE — BLADE SCALPEL SAFETYLOCK #11

## (undated) DEVICE — SOL ANTI FOG

## (undated) DEVICE — NDL HYPO REGULAR BEVEL 27G X 1.25" (GRAY)

## (undated) DEVICE — SUT POLYSORB 5-0 18" P-13 UNDYED

## (undated) DEVICE — SPECIMEN CONTAINER 4OZ

## (undated) DEVICE — SUT MONOSOF 6-0 18" P-10 UNDYED

## (undated) DEVICE — SUCTION YANKAUER TAPERED BULBOUS NO VENT

## (undated) DEVICE — SYR LUER LOK 10CC

## (undated) DEVICE — Device

## (undated) DEVICE — SOL IRR POUR H2O 1000ML

## (undated) DEVICE — PLV-SCD MACHINE: Type: DURABLE MEDICAL EQUIPMENT

## (undated) DEVICE — SUT POLYSORB 4-0 18" P-13 UNDYED

## (undated) DEVICE — ELCTR BOVIE PENCIL HANDPIECE

## (undated) DEVICE — DRAPE INSTRUMENT POUCH 6.75" X 11"

## (undated) DEVICE — PLV/PSP-ESU FORCEFX F8I7418A: Type: DURABLE MEDICAL EQUIPMENT

## (undated) DEVICE — DRAPE TOWEL BLUE 17" X 24"

## (undated) DEVICE — APPLICATOR Q TIP 6" WOOD STEM

## (undated) DEVICE — VENODYNE/SCD SLEEVE CALF MEDIUM